# Patient Record
Sex: FEMALE | Race: WHITE | HISPANIC OR LATINO | Employment: FULL TIME | URBAN - METROPOLITAN AREA
[De-identification: names, ages, dates, MRNs, and addresses within clinical notes are randomized per-mention and may not be internally consistent; named-entity substitution may affect disease eponyms.]

---

## 2018-12-26 ENCOUNTER — APPOINTMENT (OUTPATIENT)
Dept: RADIOLOGY | Facility: MEDICAL CENTER | Age: 33
DRG: 503 | End: 2018-12-26
Attending: EMERGENCY MEDICINE
Payer: COMMERCIAL

## 2018-12-26 ENCOUNTER — HOSPITAL ENCOUNTER (INPATIENT)
Facility: MEDICAL CENTER | Age: 33
LOS: 6 days | DRG: 503 | End: 2019-01-02
Attending: EMERGENCY MEDICINE | Admitting: SURGERY
Payer: COMMERCIAL

## 2018-12-26 DIAGNOSIS — I77.71 CAROTID ARTERY DISSECTION (HCC): ICD-10-CM

## 2018-12-26 DIAGNOSIS — S32.009A CLOSED FRACTURE OF TRANSVERSE PROCESS OF LUMBAR VERTEBRA, INITIAL ENCOUNTER (HCC): ICD-10-CM

## 2018-12-26 DIAGNOSIS — S93.324A LISFRANC DISLOCATION, RIGHT, INITIAL ENCOUNTER: ICD-10-CM

## 2018-12-26 DIAGNOSIS — S62.631A CLOSED DISPLACED FRACTURE OF DISTAL PHALANX OF LEFT INDEX FINGER, INITIAL ENCOUNTER: ICD-10-CM

## 2018-12-26 DIAGNOSIS — T14.90XA TRAUMA: ICD-10-CM

## 2018-12-26 PROBLEM — S92.251A: Status: ACTIVE | Noted: 2018-12-26

## 2018-12-26 PROBLEM — S62.639A PHALANX, DISTAL FRACTURE OF FINGER: Status: ACTIVE | Noted: 2018-12-26

## 2018-12-26 PROBLEM — M79.671 RIGHT FOOT PAIN: Status: ACTIVE | Noted: 2018-12-26

## 2018-12-26 LAB
ABO GROUP BLD: NORMAL
ALBUMIN SERPL BCP-MCNC: 4.2 G/DL (ref 3.2–4.9)
ALBUMIN/GLOB SERPL: 1.2 G/DL
ALP SERPL-CCNC: 67 U/L (ref 30–99)
ALT SERPL-CCNC: 8 U/L (ref 2–50)
ANION GAP SERPL CALC-SCNC: 12 MMOL/L (ref 0–11.9)
AST SERPL-CCNC: 26 U/L (ref 12–45)
BILIRUB SERPL-MCNC: 0.2 MG/DL (ref 0.1–1.5)
BLD GP AB SCN SERPL QL: NORMAL
BUN SERPL-MCNC: 12 MG/DL (ref 8–22)
CALCIUM SERPL-MCNC: 9.7 MG/DL (ref 8.5–10.5)
CHLORIDE SERPL-SCNC: 107 MMOL/L (ref 96–112)
CO2 SERPL-SCNC: 17 MMOL/L (ref 20–33)
CREAT SERPL-MCNC: 0.62 MG/DL (ref 0.5–1.4)
ERYTHROCYTE [DISTWIDTH] IN BLOOD BY AUTOMATED COUNT: 43.5 FL (ref 35.9–50)
ETHANOL BLD-MCNC: 0 G/DL
GLOBULIN SER CALC-MCNC: 3.5 G/DL (ref 1.9–3.5)
GLUCOSE SERPL-MCNC: 98 MG/DL (ref 65–99)
HCG SERPL QL: NEGATIVE
HCT VFR BLD AUTO: 34 % (ref 37–47)
HGB BLD-MCNC: 11.5 G/DL (ref 12–16)
MCH RBC QN AUTO: 27.4 PG (ref 27–33)
MCHC RBC AUTO-ENTMCNC: 33.8 G/DL (ref 33.6–35)
MCV RBC AUTO: 81.1 FL (ref 81.4–97.8)
PLATELET # BLD AUTO: 265 K/UL (ref 164–446)
PMV BLD AUTO: 10.8 FL (ref 9–12.9)
POTASSIUM SERPL-SCNC: 4.6 MMOL/L (ref 3.6–5.5)
PROT SERPL-MCNC: 7.7 G/DL (ref 6–8.2)
RBC # BLD AUTO: 4.19 M/UL (ref 4.2–5.4)
RH BLD: NORMAL
SODIUM SERPL-SCNC: 136 MMOL/L (ref 135–145)
WBC # BLD AUTO: 14.3 K/UL (ref 4.8–10.8)

## 2018-12-26 PROCEDURE — 700112 HCHG RX REV CODE 229: Performed by: SURGERY

## 2018-12-26 PROCEDURE — 80307 DRUG TEST PRSMV CHEM ANLYZR: CPT

## 2018-12-26 PROCEDURE — 70498 CT ANGIOGRAPHY NECK: CPT

## 2018-12-26 PROCEDURE — 700102 HCHG RX REV CODE 250 W/ 637 OVERRIDE(OP): Performed by: SURGERY

## 2018-12-26 PROCEDURE — 700111 HCHG RX REV CODE 636 W/ 250 OVERRIDE (IP): Performed by: SURGERY

## 2018-12-26 PROCEDURE — 71045 X-RAY EXAM CHEST 1 VIEW: CPT

## 2018-12-26 PROCEDURE — 700111 HCHG RX REV CODE 636 W/ 250 OVERRIDE (IP)

## 2018-12-26 PROCEDURE — 700117 HCHG RX CONTRAST REV CODE 255: Performed by: EMERGENCY MEDICINE

## 2018-12-26 PROCEDURE — 85027 COMPLETE CBC AUTOMATED: CPT

## 2018-12-26 PROCEDURE — 72128 CT CHEST SPINE W/O DYE: CPT

## 2018-12-26 PROCEDURE — 0QSN04Z REPOSITION RIGHT METATARSAL WITH INTERNAL FIXATION DEVICE, OPEN APPROACH: ICD-10-PCS | Performed by: ORTHOPAEDIC SURGERY

## 2018-12-26 PROCEDURE — 99285 EMERGENCY DEPT VISIT HI MDM: CPT

## 2018-12-26 PROCEDURE — 84703 CHORIONIC GONADOTROPIN ASSAY: CPT

## 2018-12-26 PROCEDURE — 86901 BLOOD TYPING SEROLOGIC RH(D): CPT

## 2018-12-26 PROCEDURE — A9270 NON-COVERED ITEM OR SERVICE: HCPCS | Performed by: SURGERY

## 2018-12-26 PROCEDURE — 305948 HCHG GREEN TRAUMA ACT PRE-NOTIFY NO CC

## 2018-12-26 PROCEDURE — 73620 X-RAY EXAM OF FOOT: CPT | Mod: RT

## 2018-12-26 PROCEDURE — 86850 RBC ANTIBODY SCREEN: CPT

## 2018-12-26 PROCEDURE — 0QSL04Z REPOSITION RIGHT TARSAL WITH INTERNAL FIXATION DEVICE, OPEN APPROACH: ICD-10-PCS | Performed by: ORTHOPAEDIC SURGERY

## 2018-12-26 PROCEDURE — 96376 TX/PRO/DX INJ SAME DRUG ADON: CPT

## 2018-12-26 PROCEDURE — 72131 CT LUMBAR SPINE W/O DYE: CPT

## 2018-12-26 PROCEDURE — 86900 BLOOD TYPING SEROLOGIC ABO: CPT

## 2018-12-26 PROCEDURE — 73130 X-RAY EXAM OF HAND: CPT | Mod: LT

## 2018-12-26 PROCEDURE — 80053 COMPREHEN METABOLIC PANEL: CPT

## 2018-12-26 PROCEDURE — 96375 TX/PRO/DX INJ NEW DRUG ADDON: CPT

## 2018-12-26 PROCEDURE — 94760 N-INVAS EAR/PLS OXIMETRY 1: CPT

## 2018-12-26 PROCEDURE — 70450 CT HEAD/BRAIN W/O DYE: CPT

## 2018-12-26 PROCEDURE — 72125 CT NECK SPINE W/O DYE: CPT

## 2018-12-26 PROCEDURE — 96374 THER/PROPH/DIAG INJ IV PUSH: CPT

## 2018-12-26 PROCEDURE — 74177 CT ABD & PELVIS W/CONTRAST: CPT

## 2018-12-26 PROCEDURE — 72170 X-RAY EXAM OF PELVIS: CPT

## 2018-12-26 RX ORDER — TRAMADOL HYDROCHLORIDE 50 MG/1
50 TABLET ORAL EVERY 4 HOURS PRN
Status: ON HOLD | COMMUNITY
End: 2019-01-02

## 2018-12-26 RX ORDER — CELECOXIB 200 MG/1
200 CAPSULE ORAL 2 TIMES DAILY WITH MEALS
Status: DISPENSED | OUTPATIENT
Start: 2018-12-26 | End: 2018-12-31

## 2018-12-26 RX ORDER — KETOROLAC TROMETHAMINE 30 MG/ML
15 INJECTION, SOLUTION INTRAMUSCULAR; INTRAVENOUS ONCE
Status: COMPLETED | OUTPATIENT
Start: 2018-12-26 | End: 2018-12-26

## 2018-12-26 RX ORDER — OXYCODONE HYDROCHLORIDE 10 MG/1
10 TABLET ORAL
Status: DISCONTINUED | OUTPATIENT
Start: 2018-12-26 | End: 2019-01-02 | Stop reason: HOSPADM

## 2018-12-26 RX ORDER — POLYETHYLENE GLYCOL 3350 17 G/17G
1 POWDER, FOR SOLUTION ORAL 2 TIMES DAILY
Status: DISCONTINUED | OUTPATIENT
Start: 2018-12-26 | End: 2019-01-02 | Stop reason: HOSPADM

## 2018-12-26 RX ORDER — ACETAMINOPHEN 500 MG
1000 TABLET ORAL EVERY 6 HOURS
Status: DISPENSED | OUTPATIENT
Start: 2018-12-27 | End: 2018-12-31

## 2018-12-26 RX ORDER — MORPHINE SULFATE 10 MG/ML
4 INJECTION, SOLUTION INTRAMUSCULAR; INTRAVENOUS
Status: DISCONTINUED | OUTPATIENT
Start: 2018-12-26 | End: 2018-12-28

## 2018-12-26 RX ORDER — MORPHINE SULFATE 10 MG/ML
4 INJECTION, SOLUTION INTRAMUSCULAR; INTRAVENOUS ONCE
Status: COMPLETED | OUTPATIENT
Start: 2018-12-26 | End: 2018-12-26

## 2018-12-26 RX ORDER — DOCUSATE SODIUM 100 MG/1
100 CAPSULE, LIQUID FILLED ORAL 2 TIMES DAILY
Status: DISCONTINUED | OUTPATIENT
Start: 2018-12-26 | End: 2019-01-02 | Stop reason: HOSPADM

## 2018-12-26 RX ORDER — ONDANSETRON 2 MG/ML
4 INJECTION INTRAMUSCULAR; INTRAVENOUS EVERY 4 HOURS PRN
Status: DISCONTINUED | OUTPATIENT
Start: 2018-12-26 | End: 2019-01-02 | Stop reason: HOSPADM

## 2018-12-26 RX ORDER — AMOXICILLIN 250 MG
1 CAPSULE ORAL
Status: DISCONTINUED | OUTPATIENT
Start: 2018-12-26 | End: 2019-01-02 | Stop reason: HOSPADM

## 2018-12-26 RX ORDER — ENEMA 19; 7 G/133ML; G/133ML
1 ENEMA RECTAL
Status: DISCONTINUED | OUTPATIENT
Start: 2018-12-26 | End: 2019-01-02 | Stop reason: HOSPADM

## 2018-12-26 RX ORDER — AMOXICILLIN 250 MG
1 CAPSULE ORAL NIGHTLY
Status: DISCONTINUED | OUTPATIENT
Start: 2018-12-26 | End: 2019-01-02 | Stop reason: HOSPADM

## 2018-12-26 RX ORDER — BISACODYL 10 MG
10 SUPPOSITORY, RECTAL RECTAL
Status: DISCONTINUED | OUTPATIENT
Start: 2018-12-26 | End: 2019-01-02 | Stop reason: HOSPADM

## 2018-12-26 RX ORDER — OXYCODONE HYDROCHLORIDE 5 MG/1
5 TABLET ORAL
Status: DISCONTINUED | OUTPATIENT
Start: 2018-12-26 | End: 2019-01-02 | Stop reason: HOSPADM

## 2018-12-26 RX ORDER — NAPROXEN 250 MG/1
250 TABLET ORAL 2 TIMES DAILY PRN
Status: ON HOLD | COMMUNITY
End: 2019-01-02

## 2018-12-26 RX ADMIN — KETOROLAC TROMETHAMINE 15 MG: 30 INJECTION, SOLUTION INTRAMUSCULAR at 20:45

## 2018-12-26 RX ADMIN — IOHEXOL 100 ML: 350 INJECTION, SOLUTION INTRAVENOUS at 20:20

## 2018-12-26 RX ADMIN — MORPHINE SULFATE 4 MG: 10 INJECTION INTRAVENOUS at 23:41

## 2018-12-26 RX ADMIN — ONDANSETRON 4 MG: 2 INJECTION INTRAMUSCULAR; INTRAVENOUS at 21:32

## 2018-12-26 RX ADMIN — STANDARDIZED SENNA CONCENTRATE AND DOCUSATE SODIUM 1 TABLET: 8.6; 5 TABLET, FILM COATED ORAL at 23:41

## 2018-12-26 RX ADMIN — DOCUSATE SODIUM 100 MG: 100 CAPSULE, LIQUID FILLED ORAL at 23:40

## 2018-12-26 RX ADMIN — MORPHINE SULFATE 4 MG: 10 INJECTION INTRAVENOUS at 21:32

## 2018-12-26 ASSESSMENT — PAIN SCALES - GENERAL: PAINLEVEL_OUTOF10: 10

## 2018-12-27 ENCOUNTER — APPOINTMENT (OUTPATIENT)
Dept: RADIOLOGY | Facility: MEDICAL CENTER | Age: 33
DRG: 503 | End: 2018-12-27
Attending: ORTHOPAEDIC SURGERY
Payer: COMMERCIAL

## 2018-12-27 ENCOUNTER — APPOINTMENT (OUTPATIENT)
Dept: RADIOLOGY | Facility: MEDICAL CENTER | Age: 33
DRG: 503 | End: 2018-12-27
Attending: NURSE PRACTITIONER
Payer: COMMERCIAL

## 2018-12-27 PROBLEM — Z78.9 NO CONTRAINDICATION TO DEEP VEIN THROMBOSIS (DVT) PROPHYLAXIS: Status: ACTIVE | Noted: 2018-12-27

## 2018-12-27 PROBLEM — S92.901A FOOT FRACTURE, RIGHT: Status: ACTIVE | Noted: 2018-12-26

## 2018-12-27 PROBLEM — S93.324A LISFRANC DISLOCATION, RIGHT, INITIAL ENCOUNTER: Status: ACTIVE | Noted: 2018-12-26

## 2018-12-27 LAB
ABO GROUP BLD: NORMAL
ALBUMIN SERPL BCP-MCNC: 4 G/DL (ref 3.2–4.9)
ALBUMIN/GLOB SERPL: 1.3 G/DL
ALP SERPL-CCNC: 70 U/L (ref 30–99)
ALT SERPL-CCNC: 24 U/L (ref 2–50)
ANION GAP SERPL CALC-SCNC: 7 MMOL/L (ref 0–11.9)
APTT PPP: 30.1 SEC (ref 24.7–36)
AST SERPL-CCNC: 55 U/L (ref 12–45)
BASOPHILS # BLD AUTO: 0.3 % (ref 0–1.8)
BASOPHILS # BLD AUTO: 0.4 % (ref 0–1.8)
BASOPHILS # BLD AUTO: 0.4 % (ref 0–1.8)
BASOPHILS # BLD: 0.03 K/UL (ref 0–0.12)
BASOPHILS # BLD: 0.03 K/UL (ref 0–0.12)
BASOPHILS # BLD: 0.04 K/UL (ref 0–0.12)
BILIRUB SERPL-MCNC: 0.4 MG/DL (ref 0.1–1.5)
BUN SERPL-MCNC: 14 MG/DL (ref 8–22)
CALCIUM SERPL-MCNC: 9 MG/DL (ref 8.5–10.5)
CFT BLD TEG: 4.1 MIN (ref 5–10)
CFT BLD TEG: 6 MIN (ref 5–10)
CHLORIDE SERPL-SCNC: 105 MMOL/L (ref 96–112)
CLOT ANGLE BLD TEG: 66.9 DEGREES (ref 53–72)
CLOT ANGLE BLD TEG: 73.3 DEGREES (ref 53–72)
CLOT LYSIS 30M P MA LENFR BLD TEG: 0 % (ref 0–8)
CLOT LYSIS 30M P MA LENFR BLD TEG: 0 % (ref 0–8)
CO2 SERPL-SCNC: 25 MMOL/L (ref 20–33)
CREAT SERPL-MCNC: 0.63 MG/DL (ref 0.5–1.4)
CT.EXTRINSIC BLD ROTEM: 1.2 MIN (ref 1–3)
CT.EXTRINSIC BLD ROTEM: 1.7 MIN (ref 1–3)
EOSINOPHIL # BLD AUTO: 0.02 K/UL (ref 0–0.51)
EOSINOPHIL # BLD AUTO: 0.11 K/UL (ref 0–0.51)
EOSINOPHIL # BLD AUTO: 0.28 K/UL (ref 0–0.51)
EOSINOPHIL NFR BLD: 0.2 % (ref 0–6.9)
EOSINOPHIL NFR BLD: 1.2 % (ref 0–6.9)
EOSINOPHIL NFR BLD: 3.5 % (ref 0–6.9)
ERYTHROCYTE [DISTWIDTH] IN BLOOD BY AUTOMATED COUNT: 45.1 FL (ref 35.9–50)
ERYTHROCYTE [DISTWIDTH] IN BLOOD BY AUTOMATED COUNT: 45.2 FL (ref 35.9–50)
ERYTHROCYTE [DISTWIDTH] IN BLOOD BY AUTOMATED COUNT: 45.3 FL (ref 35.9–50)
GLOBULIN SER CALC-MCNC: 3.2 G/DL (ref 1.9–3.5)
GLUCOSE SERPL-MCNC: 110 MG/DL (ref 65–99)
HCT VFR BLD AUTO: 29.7 % (ref 37–47)
HCT VFR BLD AUTO: 32.4 % (ref 37–47)
HCT VFR BLD AUTO: 33.8 % (ref 37–47)
HGB BLD-MCNC: 10.5 G/DL (ref 12–16)
HGB BLD-MCNC: 10.8 G/DL (ref 12–16)
HGB BLD-MCNC: 9.8 G/DL (ref 12–16)
IMM GRANULOCYTES # BLD AUTO: 0.01 K/UL (ref 0–0.11)
IMM GRANULOCYTES # BLD AUTO: 0.02 K/UL (ref 0–0.11)
IMM GRANULOCYTES # BLD AUTO: 0.03 K/UL (ref 0–0.11)
IMM GRANULOCYTES NFR BLD AUTO: 0.1 % (ref 0–0.9)
IMM GRANULOCYTES NFR BLD AUTO: 0.2 % (ref 0–0.9)
IMM GRANULOCYTES NFR BLD AUTO: 0.3 % (ref 0–0.9)
INR PPP: 1.12 (ref 0.87–1.13)
LYMPHOCYTES # BLD AUTO: 1.71 K/UL (ref 1–4.8)
LYMPHOCYTES # BLD AUTO: 2.02 K/UL (ref 1–4.8)
LYMPHOCYTES # BLD AUTO: 3.74 K/UL (ref 1–4.8)
LYMPHOCYTES NFR BLD: 18.3 % (ref 22–41)
LYMPHOCYTES NFR BLD: 22.6 % (ref 22–41)
LYMPHOCYTES NFR BLD: 46.1 % (ref 22–41)
MAGNESIUM SERPL-MCNC: 1.8 MG/DL (ref 1.5–2.5)
MCF BLD TEG: 70.2 MM (ref 50–70)
MCF BLD TEG: 70.7 MM (ref 50–70)
MCH RBC QN AUTO: 26.3 PG (ref 27–33)
MCH RBC QN AUTO: 26.8 PG (ref 27–33)
MCH RBC QN AUTO: 27.1 PG (ref 27–33)
MCHC RBC AUTO-ENTMCNC: 32 G/DL (ref 33.6–35)
MCHC RBC AUTO-ENTMCNC: 32.4 G/DL (ref 33.6–35)
MCHC RBC AUTO-ENTMCNC: 33 G/DL (ref 33.6–35)
MCV RBC AUTO: 82.3 FL (ref 81.4–97.8)
MCV RBC AUTO: 82.4 FL (ref 81.4–97.8)
MCV RBC AUTO: 82.7 FL (ref 81.4–97.8)
MONOCYTES # BLD AUTO: 0.64 K/UL (ref 0–0.85)
MONOCYTES # BLD AUTO: 0.66 K/UL (ref 0–0.85)
MONOCYTES # BLD AUTO: 0.68 K/UL (ref 0–0.85)
MONOCYTES NFR BLD AUTO: 7.1 % (ref 0–13.4)
MONOCYTES NFR BLD AUTO: 7.6 % (ref 0–13.4)
MONOCYTES NFR BLD AUTO: 7.9 % (ref 0–13.4)
NEUTROPHILS # BLD AUTO: 3.41 K/UL (ref 2–7.15)
NEUTROPHILS # BLD AUTO: 6.07 K/UL (ref 2–7.15)
NEUTROPHILS # BLD AUTO: 6.89 K/UL (ref 2–7.15)
NEUTROPHILS NFR BLD: 42 % (ref 44–72)
NEUTROPHILS NFR BLD: 68 % (ref 44–72)
NEUTROPHILS NFR BLD: 73.8 % (ref 44–72)
NRBC # BLD AUTO: 0 K/UL
NRBC BLD-RTO: 0 /100 WBC
PA AA BLD-ACNC: 30 %
PA AA BLD-ACNC: 87.2 %
PA ADP BLD-ACNC: 0 %
PA ADP BLD-ACNC: 3.9 %
PHOSPHATE SERPL-MCNC: 3.9 MG/DL (ref 2.5–4.5)
PLATELET # BLD AUTO: 269 K/UL (ref 164–446)
PLATELET # BLD AUTO: 311 K/UL (ref 164–446)
PLATELET # BLD AUTO: 315 K/UL (ref 164–446)
PLATELET # BLD AUTO: 320 K/UL (ref 164–446)
PMV BLD AUTO: 10 FL (ref 9–12.9)
PMV BLD AUTO: 10.1 FL (ref 9–12.9)
PMV BLD AUTO: 9.9 FL (ref 9–12.9)
POTASSIUM SERPL-SCNC: 4 MMOL/L (ref 3.6–5.5)
PROT SERPL-MCNC: 7.2 G/DL (ref 6–8.2)
PROTHROMBIN TIME: 14.5 SEC (ref 12–14.6)
RBC # BLD AUTO: 3.61 M/UL (ref 4.2–5.4)
RBC # BLD AUTO: 3.92 M/UL (ref 4.2–5.4)
RBC # BLD AUTO: 4.1 M/UL (ref 4.2–5.4)
RH BLD: NORMAL
SODIUM SERPL-SCNC: 137 MMOL/L (ref 135–145)
TEG ALGORITHM TGALG: ABNORMAL
TEG ALGORITHM TGALG: ABNORMAL
WBC # BLD AUTO: 8.1 K/UL (ref 4.8–10.8)
WBC # BLD AUTO: 8.9 K/UL (ref 4.8–10.8)
WBC # BLD AUTO: 9.3 K/UL (ref 4.8–10.8)

## 2018-12-27 PROCEDURE — 36415 COLL VENOUS BLD VENIPUNCTURE: CPT

## 2018-12-27 PROCEDURE — 700102 HCHG RX REV CODE 250 W/ 637 OVERRIDE(OP): Performed by: SURGERY

## 2018-12-27 PROCEDURE — 700111 HCHG RX REV CODE 636 W/ 250 OVERRIDE (IP): Performed by: SURGERY

## 2018-12-27 PROCEDURE — A9270 NON-COVERED ITEM OR SERVICE: HCPCS | Performed by: NURSE PRACTITIONER

## 2018-12-27 PROCEDURE — 83735 ASSAY OF MAGNESIUM: CPT

## 2018-12-27 PROCEDURE — 80053 COMPREHEN METABOLIC PANEL: CPT

## 2018-12-27 PROCEDURE — 73700 CT LOWER EXTREMITY W/O DYE: CPT | Mod: RT

## 2018-12-27 PROCEDURE — 3E033GC INTRODUCTION OF OTHER THERAPEUTIC SUBSTANCE INTO PERIPHERAL VEIN, PERCUTANEOUS APPROACH: ICD-10-PCS | Performed by: SURGERY

## 2018-12-27 PROCEDURE — 85347 COAGULATION TIME ACTIVATED: CPT | Mod: 91

## 2018-12-27 PROCEDURE — 85610 PROTHROMBIN TIME: CPT

## 2018-12-27 PROCEDURE — 85049 AUTOMATED PLATELET COUNT: CPT

## 2018-12-27 PROCEDURE — 85384 FIBRINOGEN ACTIVITY: CPT | Mod: 91

## 2018-12-27 PROCEDURE — A9270 NON-COVERED ITEM OR SERVICE: HCPCS | Performed by: SURGERY

## 2018-12-27 PROCEDURE — 700112 HCHG RX REV CODE 229: Performed by: SURGERY

## 2018-12-27 PROCEDURE — 85576 BLOOD PLATELET AGGREGATION: CPT | Mod: 91

## 2018-12-27 PROCEDURE — 85025 COMPLETE CBC W/AUTO DIFF WBC: CPT | Mod: 91

## 2018-12-27 PROCEDURE — 700102 HCHG RX REV CODE 250 W/ 637 OVERRIDE(OP): Performed by: NURSE PRACTITIONER

## 2018-12-27 PROCEDURE — 770006 HCHG ROOM/CARE - MED/SURG/GYN SEMI*

## 2018-12-27 PROCEDURE — 73630 X-RAY EXAM OF FOOT: CPT | Mod: RT

## 2018-12-27 PROCEDURE — 84100 ASSAY OF PHOSPHORUS: CPT

## 2018-12-27 PROCEDURE — 73090 X-RAY EXAM OF FOREARM: CPT | Mod: RT

## 2018-12-27 PROCEDURE — 85730 THROMBOPLASTIN TIME PARTIAL: CPT

## 2018-12-27 RX ORDER — CLOPIDOGREL BISULFATE 75 MG/1
75 TABLET ORAL DAILY
Status: DISCONTINUED | OUTPATIENT
Start: 2018-12-27 | End: 2018-12-27

## 2018-12-27 RX ADMIN — DOCUSATE SODIUM 100 MG: 100 CAPSULE, LIQUID FILLED ORAL at 16:51

## 2018-12-27 RX ADMIN — ONDANSETRON 4 MG: 2 INJECTION INTRAMUSCULAR; INTRAVENOUS at 10:59

## 2018-12-27 RX ADMIN — OXYCODONE HYDROCHLORIDE 10 MG: 5 TABLET ORAL at 12:01

## 2018-12-27 RX ADMIN — CELECOXIB 200 MG: 200 CAPSULE ORAL at 10:09

## 2018-12-27 RX ADMIN — OXYCODONE HYDROCHLORIDE 10 MG: 5 TABLET ORAL at 01:26

## 2018-12-27 RX ADMIN — MAGNESIUM HYDROXIDE 30 ML: 400 SUSPENSION ORAL at 07:52

## 2018-12-27 RX ADMIN — ACETAMINOPHEN 1000 MG: 500 TABLET ORAL at 01:29

## 2018-12-27 RX ADMIN — OXYCODONE HYDROCHLORIDE 10 MG: 5 TABLET ORAL at 22:55

## 2018-12-27 RX ADMIN — CLOPIDOGREL 75 MG: 75 TABLET, FILM COATED ORAL at 10:09

## 2018-12-27 RX ADMIN — CELECOXIB 200 MG: 200 CAPSULE ORAL at 16:51

## 2018-12-27 RX ADMIN — HEPARIN SODIUM 1200 UNITS/HR: 5000 INJECTION, SOLUTION INTRAVENOUS; SUBCUTANEOUS at 16:52

## 2018-12-27 RX ADMIN — ACETAMINOPHEN 1000 MG: 500 TABLET ORAL at 12:01

## 2018-12-27 RX ADMIN — ACETAMINOPHEN 1000 MG: 500 TABLET ORAL at 16:51

## 2018-12-27 RX ADMIN — OXYCODONE HYDROCHLORIDE 10 MG: 5 TABLET ORAL at 04:31

## 2018-12-27 RX ADMIN — DOCUSATE SODIUM 100 MG: 100 CAPSULE, LIQUID FILLED ORAL at 07:51

## 2018-12-27 RX ADMIN — POLYETHYLENE GLYCOL 3350 1 PACKET: 17 POWDER, FOR SOLUTION ORAL at 07:52

## 2018-12-27 RX ADMIN — ONDANSETRON 4 MG: 2 INJECTION INTRAMUSCULAR; INTRAVENOUS at 16:04

## 2018-12-27 RX ADMIN — OXYCODONE HYDROCHLORIDE 10 MG: 5 TABLET ORAL at 07:50

## 2018-12-27 RX ADMIN — ACETAMINOPHEN 1000 MG: 500 TABLET ORAL at 07:50

## 2018-12-27 ASSESSMENT — LIFESTYLE VARIABLES
AVERAGE NUMBER OF DAYS PER WEEK YOU HAVE A DRINK CONTAINING ALCOHOL: 1
EVER_SMOKED: NEVER
HOW MANY TIMES IN THE PAST YEAR HAVE YOU HAD 5 OR MORE DRINKS IN A DAY: 0
ALCOHOL_USE: YES
TOTAL SCORE: 1
EVER FELT BAD OR GUILTY ABOUT YOUR DRINKING: NO
TOTAL SCORE: 1
HAVE YOU EVER FELT YOU SHOULD CUT DOWN ON YOUR DRINKING: YES
HAVE PEOPLE ANNOYED YOU BY CRITICIZING YOUR DRINKING: NO
ON A TYPICAL DAY WHEN YOU DRINK ALCOHOL HOW MANY DRINKS DO YOU HAVE: 1
TOTAL SCORE: 1
CONSUMPTION TOTAL: NEGATIVE
EVER HAD A DRINK FIRST THING IN THE MORNING TO STEADY YOUR NERVES TO GET RID OF A HANGOVER: NO
EVER_SMOKED: NEVER

## 2018-12-27 ASSESSMENT — ENCOUNTER SYMPTOMS
VOMITING: 0
SHORTNESS OF BREATH: 0
NAUSEA: 0
COUGH: 0
HEADACHES: 0
FEVER: 0
ROS GI COMMENTS: BM PTA
BACK PAIN: 1
NECK PAIN: 1
DIZZINESS: 0
SENSORY CHANGE: 0
ABDOMINAL PAIN: 0

## 2018-12-27 ASSESSMENT — COGNITIVE AND FUNCTIONAL STATUS - GENERAL
CLIMB 3 TO 5 STEPS WITH RAILING: A LITTLE
DRESSING REGULAR LOWER BODY CLOTHING: A LITTLE
MOBILITY SCORE: 23
DRESSING REGULAR UPPER BODY CLOTHING: A LITTLE
SUGGESTED CMS G CODE MODIFIER MOBILITY: CI
SUGGESTED CMS G CODE MODIFIER DAILY ACTIVITY: CJ
DAILY ACTIVITIY SCORE: 22

## 2018-12-27 ASSESSMENT — PAIN SCALES - GENERAL
PAINLEVEL_OUTOF10: 8
PAINLEVEL_OUTOF10: 8
PAINLEVEL_OUTOF10: 5
PAINLEVEL_OUTOF10: 8

## 2018-12-27 ASSESSMENT — PATIENT HEALTH QUESTIONNAIRE - PHQ9
5. POOR APPETITE OR OVEREATING: NOT AT ALL
2. FEELING DOWN, DEPRESSED, IRRITABLE, OR HOPELESS: SEVERAL DAYS
SUM OF ALL RESPONSES TO PHQ QUESTIONS 1-9: 5
1. LITTLE INTEREST OR PLEASURE IN DOING THINGS: NOT AT ALL
7. TROUBLE CONCENTRATING ON THINGS, SUCH AS READING THE NEWSPAPER OR WATCHING TELEVISION: SEVERAL DAYS
9. THOUGHTS THAT YOU WOULD BE BETTER OFF DEAD, OR OF HURTING YOURSELF: NOT AT ALL
6. FEELING BAD ABOUT YOURSELF - OR THAT YOU ARE A FAILURE OR HAVE LET YOURSELF OR YOUR FAMILY DOWN: NOT AL ALL
4. FEELING TIRED OR HAVING LITTLE ENERGY: SEVERAL DAYS
SUM OF ALL RESPONSES TO PHQ9 QUESTIONS 1 AND 2: 1
8. MOVING OR SPEAKING SO SLOWLY THAT OTHER PEOPLE COULD HAVE NOTICED. OR THE OPPOSITE, BEING SO FIGETY OR RESTLESS THAT YOU HAVE BEEN MOVING AROUND A LOT MORE THAN USUAL: NOT AT ALL
3. TROUBLE FALLING OR STAYING ASLEEP OR SLEEPING TOO MUCH: MORE THAN HALF THE DAYS

## 2018-12-27 ASSESSMENT — COPD QUESTIONNAIRES
DURING THE PAST 4 WEEKS HOW MUCH DID YOU FEEL SHORT OF BREATH: SOME OF THE TIME
DO YOU EVER COUGH UP ANY MUCUS OR PHLEGM?: NO/ONLY WITH OCCASIONAL COLDS OR INFECTIONS
COPD SCREENING SCORE: 0
DURING THE PAST 4 WEEKS HOW MUCH DID YOU FEEL SHORT OF BREATH: NONE/LITTLE OF THE TIME
IN THE PAST 12 MONTHS DO YOU DO LESS THAN YOU USED TO BECAUSE OF YOUR BREATHING PROBLEMS: DISAGREE/UNSURE
HAVE YOU SMOKED AT LEAST 100 CIGARETTES IN YOUR ENTIRE LIFE: NO/DON'T KNOW
COPD SCREENING SCORE: 1
DO YOU EVER COUGH UP ANY MUCUS OR PHLEGM?: NO/ONLY WITH OCCASIONAL COLDS OR INFECTIONS
HAVE YOU SMOKED AT LEAST 100 CIGARETTES IN YOUR ENTIRE LIFE: NO/DON'T KNOW

## 2018-12-27 NOTE — ED NOTES
.Pt resting in room with eyes closed. Chest rise and fall noted. VSS on monitor. In full view of RN. Will continue to monitor.

## 2018-12-27 NOTE — ASSESSMENT & PLAN NOTE
12/27 Weight based heparin initiated  12/31 Heparin held for OR. Resume post-operatively  1/1 Transition to plavix. Heparin gtts stopped  Ambulate TID.  Trauma duplex as clinically indicated.

## 2018-12-27 NOTE — ED NOTES
Pt medicated for pain and given stool softeners per order set. Pt has family at bedside. Pt and family updated on POC. Call light within reach.

## 2018-12-27 NOTE — ASSESSMENT & PLAN NOTE
Grade II dissection of the distal left internal carotid artery just proximal to the carotid canal.  Non-operative management.   Heparin infusion until orthopedic fixation of foot fractures.   12/31 Heparin gtts held for OR. Resume post-operatively.  1/1 Transition to plavix. Heparin gtts stopped  Leonardo Olguin MD. Vascular Surgery.
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
## 2018-12-27 NOTE — PROGRESS NOTES
"Pt arrived with transport from ED. Turned and pivoted from gurney to bed 2.    Reviewed VS, labs, orders, and notes. A&Ox 4.    /74   Pulse 86   Temp 37.1 °C (98.8 °F) (Temporal)   Resp 17   Ht 1.651 m (5' 5\")   Wt 87.1 kg (192 lb)   LMP 12/02/2018   SpO2 98%   Breastfeeding? No   BMI 31.95 kg/m² . MEWS Score: 1.       On room air. Denies SOB.    Moves all extremities, denies numbness or tingling.     Skin assessed over bony prominences and under medical devices.     Voiding, normoactive BS, + flatus, LBM PTA.     Wound(s): generalized bruising and swelling.     Patient reports 8 /10 pain in back and right side, previously medicated per MAR.     Fall prevention education reinforced with pt. Pt is wearing treaded slipper socks. Pt calls appropriatly.     Discussed POC, all questions answered at this time. Bed is locked and in the lowest position, call light within reach, Q 1 hour rounding in place. All needs met at this time.   "

## 2018-12-27 NOTE — PROGRESS NOTES
33yoF with left thumb nondisplaced distal phalanx fracture and questionable right navicular fracture. Admitted to trauma service.  Pending additional views of right foot to rule out fracture.  Can be placed into aluminum fingertip splint for left thumb and recommend nonoperative treatment.  See full dictated consult for further details.

## 2018-12-27 NOTE — ED NOTES
Pt resting in room with eyes closed. Chest rise and fall noted. VSS on monitor. Will continue to monitor.
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
## 2018-12-27 NOTE — CARE PLAN
Problem: Safety  Goal: Will remain free from falls  Outcome: PROGRESSING AS EXPECTED  Pt verbalized understanding of fall prevention education     Problem: Knowledge Deficit  Goal: Knowledge of disease process/condition, treatment plan, diagnostic tests, and medications will improve  Outcome: PROGRESSING AS EXPECTED  Discussed POC and unit policies upon arrival to unit.

## 2018-12-27 NOTE — PROGRESS NOTES
Two RN Skin Assessment:    Patient's skin was assessed under all medical devices and over all areas of bony prominence.    Areas noted: Left shoulder abrasion and bruising.   Right hip abrasion.  Right ankle small red abrasion       No other areas of skin breakdown were noted.

## 2018-12-27 NOTE — ASSESSMENT & PLAN NOTE
Comminuted fracture in the distal aspect of the left first distal phalanx.  Non-operative management. Splinted.   Weight bearing status - Weight bearing as tolerated  Yoandy Neville MD. Orthopedic Surgery.

## 2018-12-27 NOTE — ASSESSMENT & PLAN NOTE
Fractures in the left L1 and L2 transverse processes.   Mobilize as tolerated.  Frequent neurologic assessments with initiation of heparin infusion.

## 2018-12-27 NOTE — ASSESSMENT & PLAN NOTE
CT imaging demonstrated an acute avulsion fracture of the anterolateral aspect of the medial cuneiform. Suspicious for Lisfranc ligamentous injury. Acute avulsion fracture of the posteromedial middle cuneiform. Tiny avulsion fracture of the medial navicular.  12/31 ORIF right foot.  Weight bearing status - Non-weight bearing RLE. CAM walking boot.  Yoandy Neville MD. Orthopedic Surgery.

## 2018-12-27 NOTE — ED NOTES
Report from Lucía. Pt resting on gurney with even respirations. Awaiting pt to go to assigned room.

## 2018-12-28 LAB
APTT PPP: 114.2 SEC (ref 24.7–36)
APTT PPP: 66 SEC (ref 24.7–36)
APTT PPP: 68.1 SEC (ref 24.7–36)
APTT PPP: 83.8 SEC (ref 24.7–36)
APTT PPP: 83.9 SEC (ref 24.7–36)
BASOPHILS # BLD AUTO: 0.4 % (ref 0–1.8)
BASOPHILS # BLD: 0.03 K/UL (ref 0–0.12)
EOSINOPHIL # BLD AUTO: 0.28 K/UL (ref 0–0.51)
EOSINOPHIL NFR BLD: 4 % (ref 0–6.9)
ERYTHROCYTE [DISTWIDTH] IN BLOOD BY AUTOMATED COUNT: 46.3 FL (ref 35.9–50)
HCT VFR BLD AUTO: 30.8 % (ref 37–47)
HGB BLD-MCNC: 9.9 G/DL (ref 12–16)
IMM GRANULOCYTES # BLD AUTO: 0.01 K/UL (ref 0–0.11)
IMM GRANULOCYTES NFR BLD AUTO: 0.1 % (ref 0–0.9)
LYMPHOCYTES # BLD AUTO: 3.56 K/UL (ref 1–4.8)
LYMPHOCYTES NFR BLD: 50.9 % (ref 22–41)
MCH RBC QN AUTO: 26.8 PG (ref 27–33)
MCHC RBC AUTO-ENTMCNC: 32.1 G/DL (ref 33.6–35)
MCV RBC AUTO: 83.2 FL (ref 81.4–97.8)
MONOCYTES # BLD AUTO: 0.62 K/UL (ref 0–0.85)
MONOCYTES NFR BLD AUTO: 8.9 % (ref 0–13.4)
NEUTROPHILS # BLD AUTO: 2.5 K/UL (ref 2–7.15)
NEUTROPHILS NFR BLD: 35.7 % (ref 44–72)
NRBC # BLD AUTO: 0 K/UL
NRBC BLD-RTO: 0 /100 WBC
PLATELET # BLD AUTO: 266 K/UL (ref 164–446)
PMV BLD AUTO: 10 FL (ref 9–12.9)
RBC # BLD AUTO: 3.7 M/UL (ref 4.2–5.4)
WBC # BLD AUTO: 7 K/UL (ref 4.8–10.8)

## 2018-12-28 PROCEDURE — 85730 THROMBOPLASTIN TIME PARTIAL: CPT

## 2018-12-28 PROCEDURE — 770006 HCHG ROOM/CARE - MED/SURG/GYN SEMI*

## 2018-12-28 PROCEDURE — 85025 COMPLETE CBC W/AUTO DIFF WBC: CPT

## 2018-12-28 PROCEDURE — 700102 HCHG RX REV CODE 250 W/ 637 OVERRIDE(OP): Performed by: SURGERY

## 2018-12-28 PROCEDURE — 36415 COLL VENOUS BLD VENIPUNCTURE: CPT

## 2018-12-28 PROCEDURE — 700101 HCHG RX REV CODE 250: Performed by: NURSE PRACTITIONER

## 2018-12-28 PROCEDURE — 700111 HCHG RX REV CODE 636 W/ 250 OVERRIDE (IP): Performed by: SURGERY

## 2018-12-28 PROCEDURE — A9270 NON-COVERED ITEM OR SERVICE: HCPCS | Performed by: SURGERY

## 2018-12-28 PROCEDURE — 700112 HCHG RX REV CODE 229: Performed by: SURGERY

## 2018-12-28 RX ORDER — SODIUM CHLORIDE 9 MG/ML
INJECTION, SOLUTION INTRAVENOUS
Status: ACTIVE
Start: 2018-12-28 | End: 2018-12-29

## 2018-12-28 RX ORDER — LIDOCAINE 50 MG/G
1 PATCH TOPICAL EVERY 24 HOURS
Status: DISCONTINUED | OUTPATIENT
Start: 2018-12-28 | End: 2018-12-29

## 2018-12-28 RX ORDER — MORPHINE SULFATE 4 MG/ML
4 INJECTION, SOLUTION INTRAMUSCULAR; INTRAVENOUS
Status: DISCONTINUED | OUTPATIENT
Start: 2018-12-28 | End: 2019-01-02 | Stop reason: HOSPADM

## 2018-12-28 RX ADMIN — CELECOXIB 200 MG: 200 CAPSULE ORAL at 08:43

## 2018-12-28 RX ADMIN — DOCUSATE SODIUM 100 MG: 100 CAPSULE, LIQUID FILLED ORAL at 05:27

## 2018-12-28 RX ADMIN — ONDANSETRON 4 MG: 2 INJECTION INTRAMUSCULAR; INTRAVENOUS at 02:07

## 2018-12-28 RX ADMIN — LIDOCAINE 1 PATCH: 50 PATCH TOPICAL at 11:27

## 2018-12-28 RX ADMIN — POLYETHYLENE GLYCOL 3350 1 PACKET: 17 POWDER, FOR SOLUTION ORAL at 05:27

## 2018-12-28 RX ADMIN — ACETAMINOPHEN 1000 MG: 500 TABLET ORAL at 05:26

## 2018-12-28 RX ADMIN — POLYETHYLENE GLYCOL 3350 1 PACKET: 17 POWDER, FOR SOLUTION ORAL at 19:44

## 2018-12-28 RX ADMIN — DOCUSATE SODIUM 100 MG: 100 CAPSULE, LIQUID FILLED ORAL at 19:44

## 2018-12-28 RX ADMIN — OXYCODONE HYDROCHLORIDE 10 MG: 5 TABLET ORAL at 23:52

## 2018-12-28 RX ADMIN — OXYCODONE HYDROCHLORIDE 10 MG: 5 TABLET ORAL at 02:04

## 2018-12-28 RX ADMIN — ACETAMINOPHEN 1000 MG: 500 TABLET ORAL at 11:27

## 2018-12-28 RX ADMIN — MAGNESIUM HYDROXIDE 30 ML: 400 SUSPENSION ORAL at 05:27

## 2018-12-28 RX ADMIN — OXYCODONE HYDROCHLORIDE 10 MG: 5 TABLET ORAL at 05:27

## 2018-12-28 RX ADMIN — OXYCODONE HYDROCHLORIDE 10 MG: 5 TABLET ORAL at 20:45

## 2018-12-28 RX ADMIN — ACETAMINOPHEN 1000 MG: 500 TABLET ORAL at 23:52

## 2018-12-28 RX ADMIN — HEPARIN SODIUM 1050 UNITS/HR: 5000 INJECTION, SOLUTION INTRAVENOUS; SUBCUTANEOUS at 14:54

## 2018-12-28 RX ADMIN — ACETAMINOPHEN 1000 MG: 500 TABLET ORAL at 19:44

## 2018-12-28 RX ADMIN — CELECOXIB 200 MG: 200 CAPSULE ORAL at 19:47

## 2018-12-28 RX ADMIN — OXYCODONE HYDROCHLORIDE 10 MG: 5 TABLET ORAL at 14:54

## 2018-12-28 RX ADMIN — STANDARDIZED SENNA CONCENTRATE AND DOCUSATE SODIUM 1 TABLET: 8.6; 5 TABLET, FILM COATED ORAL at 19:44

## 2018-12-28 RX ADMIN — OXYCODONE HYDROCHLORIDE 10 MG: 5 TABLET ORAL at 08:45

## 2018-12-28 ASSESSMENT — ENCOUNTER SYMPTOMS
ROS GI COMMENTS: BM PTA
SENSORY CHANGE: 0
HEADACHES: 0
FEVER: 0
COUGH: 0
NAUSEA: 0
SHORTNESS OF BREATH: 0
DIZZINESS: 0
ABDOMINAL PAIN: 0
VOMITING: 0
BACK PAIN: 1
NECK PAIN: 1

## 2018-12-28 ASSESSMENT — PAIN SCALES - GENERAL
PAINLEVEL_OUTOF10: 8
PAINLEVEL_OUTOF10: 9
PAINLEVEL_OUTOF10: 8

## 2018-12-28 ASSESSMENT — PATIENT HEALTH QUESTIONNAIRE - PHQ9
SUM OF ALL RESPONSES TO PHQ9 QUESTIONS 1 AND 2: 0
1. LITTLE INTEREST OR PLEASURE IN DOING THINGS: NOT AT ALL
2. FEELING DOWN, DEPRESSED, IRRITABLE, OR HOPELESS: NOT AT ALL
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
## 2018-12-28 NOTE — PROGRESS NOTES
Pt reports small amount of bleeding when she wipes following urination.  No juan manuel blood in urine noted.  Pt states she is not near her menstrual cycle.  BE Rosales aware.

## 2018-12-28 NOTE — PROGRESS NOTES
Vascular    Left ICA dissection with high-grade stenosis.  Needs at least plavix and maybe even full anticoagulation.  Discussed holding for foot surgery but will resume as soon as safe therafter    Leonardo Olguin MD  [facility] (General and Vascular Surgery)  Cell: [phone] (text or call is fine, if you don't reach me please try my office)  Office: [phone]  __________________________________________________________________  Patient:Kalina Collier   MRN:[id number]   CSN:[id number]    12/27/2018    4:14 PM

## 2018-12-28 NOTE — PROGRESS NOTES
Assume care.  Neuro exam completed and wnl.  Pt calm and cooperative.  IV running heparin.  Pt up to shower with assist due to wt bearing status.  Breathing reg and unlabored on RA.  No other needs at this time.  Will monitor.

## 2018-12-28 NOTE — PROGRESS NOTES
Pt aox 4. No visible signs of distress. VSS.  Tolerating medication regimen without any signs or symptoms of adverse reactions.  Pt reports 8 /10 pain, medicated per MAR.  Pt reports nausea without emesis. + BS, - BM. Voiding adequately.  Ambulatory standby assist. NWB to RLE.  On room air, no complaints of SOB.  Bed locked and in low position.  Call light within reach and able to make all needs be known.  Will continue to monitor.

## 2018-12-29 LAB
ANION GAP SERPL CALC-SCNC: 7 MMOL/L (ref 0–11.9)
APTT PPP: 70.6 SEC (ref 24.7–36)
BASOPHILS # BLD AUTO: 0.2 % (ref 0–1.8)
BASOPHILS # BLD: 0.02 K/UL (ref 0–0.12)
BUN SERPL-MCNC: 9 MG/DL (ref 8–22)
CALCIUM SERPL-MCNC: 8.8 MG/DL (ref 8.5–10.5)
CHLORIDE SERPL-SCNC: 106 MMOL/L (ref 96–112)
CO2 SERPL-SCNC: 26 MMOL/L (ref 20–33)
CREAT SERPL-MCNC: 0.64 MG/DL (ref 0.5–1.4)
EOSINOPHIL # BLD AUTO: 0.26 K/UL (ref 0–0.51)
EOSINOPHIL NFR BLD: 3.2 % (ref 0–6.9)
ERYTHROCYTE [DISTWIDTH] IN BLOOD BY AUTOMATED COUNT: 46.9 FL (ref 35.9–50)
GLUCOSE SERPL-MCNC: 100 MG/DL (ref 65–99)
HCT VFR BLD AUTO: 30.4 % (ref 37–47)
HGB BLD-MCNC: 9.7 G/DL (ref 12–16)
IMM GRANULOCYTES # BLD AUTO: 0.02 K/UL (ref 0–0.11)
IMM GRANULOCYTES NFR BLD AUTO: 0.2 % (ref 0–0.9)
LYMPHOCYTES # BLD AUTO: 3.61 K/UL (ref 1–4.8)
LYMPHOCYTES NFR BLD: 44.8 % (ref 22–41)
MCH RBC QN AUTO: 27.1 PG (ref 27–33)
MCHC RBC AUTO-ENTMCNC: 31.9 G/DL (ref 33.6–35)
MCV RBC AUTO: 84.9 FL (ref 81.4–97.8)
MONOCYTES # BLD AUTO: 0.68 K/UL (ref 0–0.85)
MONOCYTES NFR BLD AUTO: 8.4 % (ref 0–13.4)
NEUTROPHILS # BLD AUTO: 3.47 K/UL (ref 2–7.15)
NEUTROPHILS NFR BLD: 43.2 % (ref 44–72)
NRBC # BLD AUTO: 0 K/UL
NRBC BLD-RTO: 0 /100 WBC
PLATELET # BLD AUTO: 298 K/UL (ref 164–446)
PMV BLD AUTO: 10.3 FL (ref 9–12.9)
POTASSIUM SERPL-SCNC: 4.2 MMOL/L (ref 3.6–5.5)
RBC # BLD AUTO: 3.58 M/UL (ref 4.2–5.4)
SODIUM SERPL-SCNC: 139 MMOL/L (ref 135–145)
WBC # BLD AUTO: 8.1 K/UL (ref 4.8–10.8)

## 2018-12-29 PROCEDURE — 700112 HCHG RX REV CODE 229: Performed by: SURGERY

## 2018-12-29 PROCEDURE — 80048 BASIC METABOLIC PNL TOTAL CA: CPT

## 2018-12-29 PROCEDURE — A9270 NON-COVERED ITEM OR SERVICE: HCPCS | Performed by: SURGERY

## 2018-12-29 PROCEDURE — 85730 THROMBOPLASTIN TIME PARTIAL: CPT

## 2018-12-29 PROCEDURE — 700111 HCHG RX REV CODE 636 W/ 250 OVERRIDE (IP): Performed by: SURGERY

## 2018-12-29 PROCEDURE — 700101 HCHG RX REV CODE 250: Performed by: NURSE PRACTITIONER

## 2018-12-29 PROCEDURE — 36415 COLL VENOUS BLD VENIPUNCTURE: CPT

## 2018-12-29 PROCEDURE — 85025 COMPLETE CBC W/AUTO DIFF WBC: CPT

## 2018-12-29 PROCEDURE — 770006 HCHG ROOM/CARE - MED/SURG/GYN SEMI*

## 2018-12-29 PROCEDURE — 700102 HCHG RX REV CODE 250 W/ 637 OVERRIDE(OP): Performed by: SURGERY

## 2018-12-29 RX ORDER — LIDOCAINE 50 MG/G
2 PATCH TOPICAL EVERY 24 HOURS
Status: DISCONTINUED | OUTPATIENT
Start: 2018-12-29 | End: 2019-01-02 | Stop reason: HOSPADM

## 2018-12-29 RX ADMIN — HEPARIN SODIUM 1050 UNITS/HR: 5000 INJECTION, SOLUTION INTRAVENOUS; SUBCUTANEOUS at 14:38

## 2018-12-29 RX ADMIN — POLYETHYLENE GLYCOL 3350 1 PACKET: 17 POWDER, FOR SOLUTION ORAL at 20:22

## 2018-12-29 RX ADMIN — MAGNESIUM HYDROXIDE 30 ML: 400 SUSPENSION ORAL at 04:23

## 2018-12-29 RX ADMIN — ONDANSETRON 4 MG: 2 INJECTION INTRAMUSCULAR; INTRAVENOUS at 17:49

## 2018-12-29 RX ADMIN — ONDANSETRON 4 MG: 2 INJECTION INTRAMUSCULAR; INTRAVENOUS at 08:11

## 2018-12-29 RX ADMIN — OXYCODONE HYDROCHLORIDE 10 MG: 5 TABLET ORAL at 23:20

## 2018-12-29 RX ADMIN — CELECOXIB 200 MG: 200 CAPSULE ORAL at 09:07

## 2018-12-29 RX ADMIN — ACETAMINOPHEN 1000 MG: 500 TABLET ORAL at 12:07

## 2018-12-29 RX ADMIN — ACETAMINOPHEN 1000 MG: 500 TABLET ORAL at 20:20

## 2018-12-29 RX ADMIN — LIDOCAINE 2 PATCH: 50 PATCH TOPICAL at 13:37

## 2018-12-29 RX ADMIN — POLYETHYLENE GLYCOL 3350 1 PACKET: 17 POWDER, FOR SOLUTION ORAL at 04:23

## 2018-12-29 RX ADMIN — CELECOXIB 200 MG: 200 CAPSULE ORAL at 20:21

## 2018-12-29 RX ADMIN — DOCUSATE SODIUM 100 MG: 100 CAPSULE, LIQUID FILLED ORAL at 20:21

## 2018-12-29 RX ADMIN — ACETAMINOPHEN 1000 MG: 500 TABLET ORAL at 23:20

## 2018-12-29 RX ADMIN — OXYCODONE HYDROCHLORIDE 10 MG: 5 TABLET ORAL at 09:07

## 2018-12-29 RX ADMIN — STANDARDIZED SENNA CONCENTRATE AND DOCUSATE SODIUM 1 TABLET: 8.6; 5 TABLET, FILM COATED ORAL at 20:21

## 2018-12-29 RX ADMIN — ACETAMINOPHEN 1000 MG: 500 TABLET ORAL at 06:36

## 2018-12-29 RX ADMIN — OXYCODONE HYDROCHLORIDE 10 MG: 5 TABLET ORAL at 12:08

## 2018-12-29 RX ADMIN — OXYCODONE HYDROCHLORIDE 10 MG: 5 TABLET ORAL at 16:21

## 2018-12-29 RX ADMIN — OXYCODONE HYDROCHLORIDE 10 MG: 5 TABLET ORAL at 20:20

## 2018-12-29 RX ADMIN — DOCUSATE SODIUM 100 MG: 100 CAPSULE, LIQUID FILLED ORAL at 04:23

## 2018-12-29 ASSESSMENT — ENCOUNTER SYMPTOMS
VOMITING: 0
FEVER: 0
SHORTNESS OF BREATH: 0
DIZZINESS: 0
COUGH: 0
ABDOMINAL PAIN: 0
BACK PAIN: 1
HEADACHES: 0
NECK PAIN: 1
NAUSEA: 0
ROS GI COMMENTS: BM PTA
SENSORY CHANGE: 0

## 2018-12-29 ASSESSMENT — PAIN SCALES - GENERAL
PAINLEVEL_OUTOF10: 6
PAINLEVEL_OUTOF10: 9
PAINLEVEL_OUTOF10: 8
PAINLEVEL_OUTOF10: 6
PAINLEVEL_OUTOF10: 7
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
## 2018-12-29 NOTE — CARE PLAN
Problem: Knowledge Deficit  Goal: Knowledge of disease process/condition, treatment plan, diagnostic tests, and medications will improve  Outcome: PROGRESSING AS EXPECTED    Intervention: Explain information regarding disease process/condition, treatment plan, diagnostic tests, and medications and document in education  MD and RN updated patient on plan of care, verbalized understanding, questions answered.      Problem: Pain Management  Goal: Pain level will decrease to patient's comfort goal  Outcome: PROGRESSING AS EXPECTED    Intervention: Follow pain managment plan developed in collaboration with patient and Interdisciplinary Team  Patient has Oxy PRN for pain. This has been adequately controlling it. Will continue to monitor and intervene when necessary

## 2018-12-29 NOTE — PROGRESS NOTES
Bedside report received.  Assessment complete.  A&O x 4. Patient calls appropriately.  Patient up with stand by assist and FWW.   Patient has 9/10 pain. PRN oxy given  Some nausea with movement, no vomiting. Tolerating regullar diet.  Surgery scheduled for 12/31, walking boot on R foot. L thumb with splint  + void, - flatus, - BM.  Patient denies SOB.  Patient in pleasant mood, pain management is biggest concern at this time.  Review plan with of care with patient. Call light and personal belongings with in reach. Hourly rounding in place. All needs met at this time.
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
## 2018-12-29 NOTE — CARE PLAN
Problem: Safety  Goal: Will remain free from falls  Outcome: PROGRESSING AS EXPECTED  Patient calls appropriately for assistance.     Problem: Pain Management  Goal: Pain level will decrease to patient's comfort goal  Outcome: PROGRESSING AS EXPECTED  PRN PO pain medication adequately reduces patient's pain
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
## 2018-12-29 NOTE — PROGRESS NOTES
Received report from day shift RN. Assumed patient care  AOx4  Back pain rated at 9/10, repositioned, medicated per MAR. Ice pack applied  Room air  Boot in place to RLE, non weight bearing  Ambulates SBA with FWW with steady gait  Patient reports she has started her period and states that she has started earlier than normal. Educated patient to let nursing staff know if bleeding increases. Patient reports some lightheadedness.  Heparin drip infusing, next aptt scheduled on 12/29 at 2000  LBM PTA, stool softeners administered.   No complaints of nausea at this time, regular diet  Call light within reach  Bed locked and in low position   POC discussed with patient  All needs met at this time.

## 2018-12-30 LAB
ANION GAP SERPL CALC-SCNC: 8 MMOL/L (ref 0–11.9)
APTT PPP: 65.9 SEC (ref 24.7–36)
BASOPHILS # BLD AUTO: 0.3 % (ref 0–1.8)
BASOPHILS # BLD: 0.02 K/UL (ref 0–0.12)
BUN SERPL-MCNC: 10 MG/DL (ref 8–22)
CALCIUM SERPL-MCNC: 8.7 MG/DL (ref 8.5–10.5)
CHLORIDE SERPL-SCNC: 105 MMOL/L (ref 96–112)
CO2 SERPL-SCNC: 24 MMOL/L (ref 20–33)
CREAT SERPL-MCNC: 0.69 MG/DL (ref 0.5–1.4)
EOSINOPHIL # BLD AUTO: 0.17 K/UL (ref 0–0.51)
EOSINOPHIL NFR BLD: 2.5 % (ref 0–6.9)
ERYTHROCYTE [DISTWIDTH] IN BLOOD BY AUTOMATED COUNT: 47.1 FL (ref 35.9–50)
GLUCOSE SERPL-MCNC: 100 MG/DL (ref 65–99)
HCT VFR BLD AUTO: 30.7 % (ref 37–47)
HGB BLD-MCNC: 9.7 G/DL (ref 12–16)
IMM GRANULOCYTES # BLD AUTO: 0.02 K/UL (ref 0–0.11)
IMM GRANULOCYTES NFR BLD AUTO: 0.3 % (ref 0–0.9)
LYMPHOCYTES # BLD AUTO: 3.07 K/UL (ref 1–4.8)
LYMPHOCYTES NFR BLD: 45.9 % (ref 22–41)
MCH RBC QN AUTO: 27 PG (ref 27–33)
MCHC RBC AUTO-ENTMCNC: 31.6 G/DL (ref 33.6–35)
MCV RBC AUTO: 85.5 FL (ref 81.4–97.8)
MONOCYTES # BLD AUTO: 0.54 K/UL (ref 0–0.85)
MONOCYTES NFR BLD AUTO: 8.1 % (ref 0–13.4)
NEUTROPHILS # BLD AUTO: 2.87 K/UL (ref 2–7.15)
NEUTROPHILS NFR BLD: 42.9 % (ref 44–72)
NRBC # BLD AUTO: 0 K/UL
NRBC BLD-RTO: 0 /100 WBC
PLATELET # BLD AUTO: 289 K/UL (ref 164–446)
PMV BLD AUTO: 10.4 FL (ref 9–12.9)
POTASSIUM SERPL-SCNC: 4.1 MMOL/L (ref 3.6–5.5)
RBC # BLD AUTO: 3.59 M/UL (ref 4.2–5.4)
SODIUM SERPL-SCNC: 137 MMOL/L (ref 135–145)
WBC # BLD AUTO: 6.7 K/UL (ref 4.8–10.8)

## 2018-12-30 PROCEDURE — 85025 COMPLETE CBC W/AUTO DIFF WBC: CPT

## 2018-12-30 PROCEDURE — 36415 COLL VENOUS BLD VENIPUNCTURE: CPT

## 2018-12-30 PROCEDURE — A9270 NON-COVERED ITEM OR SERVICE: HCPCS | Performed by: SURGERY

## 2018-12-30 PROCEDURE — 85730 THROMBOPLASTIN TIME PARTIAL: CPT

## 2018-12-30 PROCEDURE — 700112 HCHG RX REV CODE 229: Performed by: SURGERY

## 2018-12-30 PROCEDURE — 700101 HCHG RX REV CODE 250: Performed by: NURSE PRACTITIONER

## 2018-12-30 PROCEDURE — A9270 NON-COVERED ITEM OR SERVICE: HCPCS | Performed by: NURSE PRACTITIONER

## 2018-12-30 PROCEDURE — 700102 HCHG RX REV CODE 250 W/ 637 OVERRIDE(OP): Performed by: SURGERY

## 2018-12-30 PROCEDURE — 700102 HCHG RX REV CODE 250 W/ 637 OVERRIDE(OP): Performed by: NURSE PRACTITIONER

## 2018-12-30 PROCEDURE — 80048 BASIC METABOLIC PNL TOTAL CA: CPT

## 2018-12-30 PROCEDURE — 700111 HCHG RX REV CODE 636 W/ 250 OVERRIDE (IP): Performed by: SURGERY

## 2018-12-30 PROCEDURE — 770006 HCHG ROOM/CARE - MED/SURG/GYN SEMI*

## 2018-12-30 RX ORDER — SCOLOPAMINE TRANSDERMAL SYSTEM 1 MG/1
1 PATCH, EXTENDED RELEASE TRANSDERMAL
Status: DISCONTINUED | OUTPATIENT
Start: 2018-12-30 | End: 2019-01-02 | Stop reason: HOSPADM

## 2018-12-30 RX ADMIN — POLYETHYLENE GLYCOL 3350 1 PACKET: 17 POWDER, FOR SOLUTION ORAL at 17:57

## 2018-12-30 RX ADMIN — ONDANSETRON 4 MG: 2 INJECTION INTRAMUSCULAR; INTRAVENOUS at 05:56

## 2018-12-30 RX ADMIN — CELECOXIB 200 MG: 200 CAPSULE ORAL at 09:17

## 2018-12-30 RX ADMIN — ONDANSETRON 4 MG: 2 INJECTION INTRAMUSCULAR; INTRAVENOUS at 14:12

## 2018-12-30 RX ADMIN — MAGNESIUM CITRATE 148 ML: 1.75 LIQUID ORAL at 09:19

## 2018-12-30 RX ADMIN — POLYETHYLENE GLYCOL 3350 1 PACKET: 17 POWDER, FOR SOLUTION ORAL at 05:32

## 2018-12-30 RX ADMIN — ONDANSETRON 4 MG: 2 INJECTION INTRAMUSCULAR; INTRAVENOUS at 10:16

## 2018-12-30 RX ADMIN — OXYCODONE HYDROCHLORIDE 10 MG: 5 TABLET ORAL at 06:43

## 2018-12-30 RX ADMIN — ACETAMINOPHEN 1000 MG: 500 TABLET ORAL at 17:57

## 2018-12-30 RX ADMIN — HEPARIN SODIUM 1050 UNITS/HR: 5000 INJECTION, SOLUTION INTRAVENOUS; SUBCUTANEOUS at 14:13

## 2018-12-30 RX ADMIN — ACETAMINOPHEN 1000 MG: 500 TABLET ORAL at 05:32

## 2018-12-30 RX ADMIN — DOCUSATE SODIUM 100 MG: 100 CAPSULE, LIQUID FILLED ORAL at 05:32

## 2018-12-30 RX ADMIN — OXYCODONE HYDROCHLORIDE 10 MG: 5 TABLET ORAL at 16:16

## 2018-12-30 RX ADMIN — SCOPALAMINE 1 PATCH: 1 PATCH, EXTENDED RELEASE TRANSDERMAL at 16:17

## 2018-12-30 RX ADMIN — OXYCODONE HYDROCHLORIDE 10 MG: 5 TABLET ORAL at 03:33

## 2018-12-30 RX ADMIN — MAGNESIUM HYDROXIDE 30 ML: 400 SUSPENSION ORAL at 05:32

## 2018-12-30 RX ADMIN — ACETAMINOPHEN 1000 MG: 500 TABLET ORAL at 23:13

## 2018-12-30 RX ADMIN — DOCUSATE SODIUM 100 MG: 100 CAPSULE, LIQUID FILLED ORAL at 17:57

## 2018-12-30 RX ADMIN — OXYCODONE HYDROCHLORIDE 10 MG: 5 TABLET ORAL at 21:05

## 2018-12-30 RX ADMIN — LIDOCAINE 2 PATCH: 50 PATCH TOPICAL at 10:16

## 2018-12-30 RX ADMIN — CELECOXIB 200 MG: 200 CAPSULE ORAL at 17:57

## 2018-12-30 RX ADMIN — OXYCODONE HYDROCHLORIDE 5 MG: 5 TABLET ORAL at 10:16

## 2018-12-30 ASSESSMENT — PAIN SCALES - GENERAL
PAINLEVEL_OUTOF10: ASSUMED PAIN PRESENT
PAINLEVEL_OUTOF10: 6
PAINLEVEL_OUTOF10: 8
PAINLEVEL_OUTOF10: 4
PAINLEVEL_OUTOF10: 4
PAINLEVEL_OUTOF10: ASSUMED PAIN PRESENT
PAINLEVEL_OUTOF10: 8

## 2018-12-30 ASSESSMENT — ENCOUNTER SYMPTOMS
FEVER: 0
SHORTNESS OF BREATH: 0
NAUSEA: 0
BACK PAIN: 1
DIZZINESS: 0
SENSORY CHANGE: 0
HEADACHES: 0
NECK PAIN: 1
ABDOMINAL PAIN: 0
COUGH: 0
ROS GI COMMENTS: BM 12/29
VOMITING: 0

## 2018-12-30 NOTE — CARE PLAN
Problem: Knowledge Deficit  Goal: Knowledge of disease process/condition, treatment plan, diagnostic tests, and medications will improve  Outcome: PROGRESSING AS EXPECTED    Intervention: Explain information regarding disease process/condition, treatment plan, diagnostic tests, and medications and document in education  Patient updated on plan of care by MD and RN. Verbalized understanding      Problem: Pain Management  Goal: Pain level will decrease to patient's comfort goal  Outcome: PROGRESSING AS EXPECTED    Intervention: Follow pain managment plan developed in collaboration with patient and Interdisciplinary Team  PRN oxy helps patient with pain control. Patient also states that the Lidocaine patches help substantially. Scheduled tylenol and gabapentin present. Continuing to monitor and intervene when necessary

## 2018-12-30 NOTE — PROGRESS NOTES
Bedside report received.  Assessment complete.  A&O x 4. Patient calls appropriately.  Patient up with stand by assist and FWW.   Patient has 6/10 pain. Pain medication given  Some nausea, no episodes of emesis. Tolerating regular diet.  R foot boot in place, NWB. L thumb splint in place. Slight swelling on L neck, no changes  + void, + flatus, + BM, however very small, drinking citrate for a bigger BM.  Patient denies SOB.  Patient has surgery scheduled for tomorrow on R foot. Heparin to be stopped at 0600 on 12/31.  Review plan with of care with patient. Call light and personal belongings with in reach. Hourly rounding in place. All needs met at this time.
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
## 2018-12-30 NOTE — CARE PLAN
Problem: Communication  Goal: The ability to communicate needs accurately and effectively will improve  Outcome: PROGRESSING AS EXPECTED  Patient able to articulate needs. Utilizes call light appropriately and calls for assistance with ambulation to/from bathroom.     Problem: Safety  Goal: Will remain free from injury  Outcome: PROGRESSING AS EXPECTED  SBA with FWW. Reinforced fall education and staff assistance with toileting. Verbalized understanding.     Problem: Bowel/Gastric:  Goal: Normal bowel function is maintained or improved  Outcome: PROGRESSING AS EXPECTED  Normoactive BS x 4 quadrants. Fluids encouraged. + BM reported.

## 2018-12-30 NOTE — PROGRESS NOTES
"Received report from day shift RN; assumed care. AOx4. VSS. 95% on RA. Medicated for pain; see MAR.   Ice pack to low back. Boot in place to RLE, non weight bearing. + CMS. Ambulates SBA with FWW with steady gait. + Menses. + voiding. Normoactive BS x 4 quadrants. Mild tenderness noted/reported with palpation. + BM. Heparin drip infusing, rate verified by 2 RNS. Next aPTT scheduled in 24 hours. Patient tolerating diet, the nausea reported during day shift had subsided. Patient stated she was \"picking at her food.\"   No complaints of nausea at this time. POC discussed. Bed locked/lowest position. Call light/belongings within reach. All needs met at present time.   "

## 2018-12-30 NOTE — PROGRESS NOTES
Patient unable to void since catheter removal, bladder scan results 270mL. Will give more time and rescan if cannot void
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
## 2018-12-31 ENCOUNTER — APPOINTMENT (OUTPATIENT)
Dept: RADIOLOGY | Facility: MEDICAL CENTER | Age: 33
DRG: 503 | End: 2018-12-31
Attending: ORTHOPAEDIC SURGERY
Payer: COMMERCIAL

## 2018-12-31 LAB
ANION GAP SERPL CALC-SCNC: 6 MMOL/L (ref 0–11.9)
BASOPHILS # BLD AUTO: 0.4 % (ref 0–1.8)
BASOPHILS # BLD: 0.03 K/UL (ref 0–0.12)
BUN SERPL-MCNC: 10 MG/DL (ref 8–22)
CALCIUM SERPL-MCNC: 8.8 MG/DL (ref 8.5–10.5)
CHLORIDE SERPL-SCNC: 106 MMOL/L (ref 96–112)
CO2 SERPL-SCNC: 25 MMOL/L (ref 20–33)
CREAT SERPL-MCNC: 0.61 MG/DL (ref 0.5–1.4)
EOSINOPHIL # BLD AUTO: 0.16 K/UL (ref 0–0.51)
EOSINOPHIL NFR BLD: 2.3 % (ref 0–6.9)
ERYTHROCYTE [DISTWIDTH] IN BLOOD BY AUTOMATED COUNT: 48.2 FL (ref 35.9–50)
GLUCOSE SERPL-MCNC: 96 MG/DL (ref 65–99)
HCG UR QL: NEGATIVE
HCT VFR BLD AUTO: 30.6 % (ref 37–47)
HGB BLD-MCNC: 9.5 G/DL (ref 12–16)
IMM GRANULOCYTES # BLD AUTO: 0.03 K/UL (ref 0–0.11)
IMM GRANULOCYTES NFR BLD AUTO: 0.4 % (ref 0–0.9)
LYMPHOCYTES # BLD AUTO: 3.1 K/UL (ref 1–4.8)
LYMPHOCYTES NFR BLD: 43.7 % (ref 22–41)
MCH RBC QN AUTO: 26.8 PG (ref 27–33)
MCHC RBC AUTO-ENTMCNC: 31 G/DL (ref 33.6–35)
MCV RBC AUTO: 86.2 FL (ref 81.4–97.8)
MONOCYTES # BLD AUTO: 0.6 K/UL (ref 0–0.85)
MONOCYTES NFR BLD AUTO: 8.5 % (ref 0–13.4)
NEUTROPHILS # BLD AUTO: 3.18 K/UL (ref 2–7.15)
NEUTROPHILS NFR BLD: 44.7 % (ref 44–72)
NRBC # BLD AUTO: 0 K/UL
NRBC BLD-RTO: 0 /100 WBC
PLATELET # BLD AUTO: 285 K/UL (ref 164–446)
PMV BLD AUTO: 10 FL (ref 9–12.9)
POTASSIUM SERPL-SCNC: 4.2 MMOL/L (ref 3.6–5.5)
RBC # BLD AUTO: 3.55 M/UL (ref 4.2–5.4)
SODIUM SERPL-SCNC: 137 MMOL/L (ref 135–145)
SP GR UR STRIP.AUTO: 1.01
WBC # BLD AUTO: 7.1 K/UL (ref 4.8–10.8)

## 2018-12-31 PROCEDURE — 80048 BASIC METABOLIC PNL TOTAL CA: CPT

## 2018-12-31 PROCEDURE — 160009 HCHG ANES TIME/MIN: Performed by: ORTHOPAEDIC SURGERY

## 2018-12-31 PROCEDURE — A9270 NON-COVERED ITEM OR SERVICE: HCPCS | Performed by: SURGERY

## 2018-12-31 PROCEDURE — 500881 HCHG PACK, EXTREMITY: Performed by: ORTHOPAEDIC SURGERY

## 2018-12-31 PROCEDURE — 81025 URINE PREGNANCY TEST: CPT

## 2018-12-31 PROCEDURE — 160048 HCHG OR STATISTICAL LEVEL 1-5: Performed by: ORTHOPAEDIC SURGERY

## 2018-12-31 PROCEDURE — 160028 HCHG SURGERY MINUTES - 1ST 30 MINS LEVEL 3: Performed by: ORTHOPAEDIC SURGERY

## 2018-12-31 PROCEDURE — 160002 HCHG RECOVERY MINUTES (STAT): Performed by: ORTHOPAEDIC SURGERY

## 2018-12-31 PROCEDURE — 160036 HCHG PACU - EA ADDL 30 MINS PHASE I: Performed by: ORTHOPAEDIC SURGERY

## 2018-12-31 PROCEDURE — 81002 URINALYSIS NONAUTO W/O SCOPE: CPT

## 2018-12-31 PROCEDURE — 160039 HCHG SURGERY MINUTES - EA ADDL 1 MIN LEVEL 3: Performed by: ORTHOPAEDIC SURGERY

## 2018-12-31 PROCEDURE — 700111 HCHG RX REV CODE 636 W/ 250 OVERRIDE (IP): Performed by: ORTHOPAEDIC SURGERY

## 2018-12-31 PROCEDURE — C1713 ANCHOR/SCREW BN/BN,TIS/BN: HCPCS | Performed by: ORTHOPAEDIC SURGERY

## 2018-12-31 PROCEDURE — 700105 HCHG RX REV CODE 258

## 2018-12-31 PROCEDURE — 85025 COMPLETE CBC W/AUTO DIFF WBC: CPT

## 2018-12-31 PROCEDURE — A9270 NON-COVERED ITEM OR SERVICE: HCPCS | Performed by: ANESTHESIOLOGY

## 2018-12-31 PROCEDURE — 700102 HCHG RX REV CODE 250 W/ 637 OVERRIDE(OP): Performed by: SURGERY

## 2018-12-31 PROCEDURE — 36415 COLL VENOUS BLD VENIPUNCTURE: CPT

## 2018-12-31 PROCEDURE — 501838 HCHG SUTURE GENERAL: Performed by: ORTHOPAEDIC SURGERY

## 2018-12-31 PROCEDURE — 700111 HCHG RX REV CODE 636 W/ 250 OVERRIDE (IP): Performed by: SURGERY

## 2018-12-31 PROCEDURE — 160035 HCHG PACU - 1ST 60 MINS PHASE I: Performed by: ORTHOPAEDIC SURGERY

## 2018-12-31 PROCEDURE — 770006 HCHG ROOM/CARE - MED/SURG/GYN SEMI*

## 2018-12-31 PROCEDURE — 700102 HCHG RX REV CODE 250 W/ 637 OVERRIDE(OP): Performed by: ANESTHESIOLOGY

## 2018-12-31 PROCEDURE — 700111 HCHG RX REV CODE 636 W/ 250 OVERRIDE (IP): Performed by: ANESTHESIOLOGY

## 2018-12-31 PROCEDURE — 700101 HCHG RX REV CODE 250

## 2018-12-31 PROCEDURE — 700111 HCHG RX REV CODE 636 W/ 250 OVERRIDE (IP)

## 2018-12-31 PROCEDURE — 502000 HCHG MISC OR IMPLANTS RC 0278: Performed by: ORTHOPAEDIC SURGERY

## 2018-12-31 PROCEDURE — 73630 X-RAY EXAM OF FOOT: CPT | Mod: RT

## 2018-12-31 DEVICE — SCREW LOCKING SELF TAPPING WITH T8 STARDRIVE VA RECESS 2.7MM 12MM (1TX6+1TX3=9): Type: IMPLANTABLE DEVICE | Site: FOOT | Status: FUNCTIONAL

## 2018-12-31 DEVICE — SCREW CORT 3.5X30MM ST HEX (4TX6+1TX4+1TX3=31)(SDS=22): Type: IMPLANTABLE DEVICE | Site: FOOT | Status: FUNCTIONAL

## 2018-12-31 DEVICE — SCREW LOCKING SELF TAPPING WITH T8 STARDRIVE VA RECESS 2.7MM 20MM (1TX6+1TX3=9): Type: IMPLANTABLE DEVICE | Site: FOOT | Status: FUNCTIONAL

## 2018-12-31 DEVICE — IMPLANTABLE DEVICE: Type: IMPLANTABLE DEVICE | Site: FOOT | Status: FUNCTIONAL

## 2018-12-31 DEVICE — SCREW CORT 3.5X34MM ST HEX (4TX6+1TX4+1TX3=31)(SDS=22): Type: IMPLANTABLE DEVICE | Site: FOOT | Status: FUNCTIONAL

## 2018-12-31 DEVICE — SCREW LOCKING SELF TAPPING WITH T8 STARDRIVE VA RECESS 2.7MM 18MM (1TX6+1TX3=9): Type: IMPLANTABLE DEVICE | Site: FOOT | Status: FUNCTIONAL

## 2018-12-31 RX ORDER — HYDRALAZINE HYDROCHLORIDE 20 MG/ML
5 INJECTION INTRAMUSCULAR; INTRAVENOUS
Status: DISCONTINUED | OUTPATIENT
Start: 2018-12-31 | End: 2018-12-31 | Stop reason: HOSPADM

## 2018-12-31 RX ORDER — HYDROMORPHONE HYDROCHLORIDE 1 MG/ML
0.4 INJECTION, SOLUTION INTRAMUSCULAR; INTRAVENOUS; SUBCUTANEOUS
Status: DISCONTINUED | OUTPATIENT
Start: 2018-12-31 | End: 2018-12-31 | Stop reason: HOSPADM

## 2018-12-31 RX ORDER — DIPHENHYDRAMINE HYDROCHLORIDE 50 MG/ML
12.5 INJECTION INTRAMUSCULAR; INTRAVENOUS
Status: DISCONTINUED | OUTPATIENT
Start: 2018-12-31 | End: 2018-12-31 | Stop reason: HOSPADM

## 2018-12-31 RX ORDER — BUPIVACAINE HYDROCHLORIDE AND EPINEPHRINE 5; 5 MG/ML; UG/ML
INJECTION, SOLUTION EPIDURAL; INTRACAUDAL; PERINEURAL
Status: DISCONTINUED | OUTPATIENT
Start: 2018-12-31 | End: 2018-12-31 | Stop reason: HOSPADM

## 2018-12-31 RX ORDER — CEFAZOLIN SODIUM 2 G/100ML
2 INJECTION, SOLUTION INTRAVENOUS EVERY 8 HOURS
Status: COMPLETED | OUTPATIENT
Start: 2018-12-31 | End: 2019-01-01

## 2018-12-31 RX ORDER — SODIUM CHLORIDE 9 MG/ML
INJECTION, SOLUTION INTRAVENOUS
Status: COMPLETED
Start: 2018-12-31 | End: 2018-12-31

## 2018-12-31 RX ORDER — ONDANSETRON 2 MG/ML
4 INJECTION INTRAMUSCULAR; INTRAVENOUS
Status: DISCONTINUED | OUTPATIENT
Start: 2018-12-31 | End: 2018-12-31 | Stop reason: HOSPADM

## 2018-12-31 RX ORDER — ACETAMINOPHEN 500 MG
1000 TABLET ORAL ONCE
Status: COMPLETED | OUTPATIENT
Start: 2018-12-31 | End: 2018-12-31

## 2018-12-31 RX ORDER — MEPERIDINE HYDROCHLORIDE 25 MG/ML
12.5 INJECTION INTRAMUSCULAR; INTRAVENOUS; SUBCUTANEOUS
Status: DISCONTINUED | OUTPATIENT
Start: 2018-12-31 | End: 2018-12-31 | Stop reason: HOSPADM

## 2018-12-31 RX ORDER — SODIUM CHLORIDE, SODIUM LACTATE, POTASSIUM CHLORIDE, CALCIUM CHLORIDE 600; 310; 30; 20 MG/100ML; MG/100ML; MG/100ML; MG/100ML
INJECTION, SOLUTION INTRAVENOUS CONTINUOUS
Status: DISCONTINUED | OUTPATIENT
Start: 2018-12-31 | End: 2018-12-31 | Stop reason: HOSPADM

## 2018-12-31 RX ORDER — HALOPERIDOL 5 MG/ML
1 INJECTION INTRAMUSCULAR
Status: DISCONTINUED | OUTPATIENT
Start: 2018-12-31 | End: 2018-12-31 | Stop reason: HOSPADM

## 2018-12-31 RX ORDER — GABAPENTIN 300 MG/1
300 CAPSULE ORAL ONCE
Status: COMPLETED | OUTPATIENT
Start: 2018-12-31 | End: 2018-12-31

## 2018-12-31 RX ORDER — HYDROMORPHONE HYDROCHLORIDE 1 MG/ML
0.2 INJECTION, SOLUTION INTRAMUSCULAR; INTRAVENOUS; SUBCUTANEOUS
Status: DISCONTINUED | OUTPATIENT
Start: 2018-12-31 | End: 2018-12-31 | Stop reason: HOSPADM

## 2018-12-31 RX ORDER — CELECOXIB 200 MG/1
200 CAPSULE ORAL ONCE
Status: COMPLETED | OUTPATIENT
Start: 2018-12-31 | End: 2018-12-31

## 2018-12-31 RX ORDER — OXYCODONE HCL 5 MG/5 ML
10 SOLUTION, ORAL ORAL
Status: COMPLETED | OUTPATIENT
Start: 2018-12-31 | End: 2018-12-31

## 2018-12-31 RX ORDER — OXYCODONE HCL 5 MG/5 ML
5 SOLUTION, ORAL ORAL
Status: COMPLETED | OUTPATIENT
Start: 2018-12-31 | End: 2018-12-31

## 2018-12-31 RX ORDER — HYDROMORPHONE HYDROCHLORIDE 1 MG/ML
0.1 INJECTION, SOLUTION INTRAMUSCULAR; INTRAVENOUS; SUBCUTANEOUS
Status: DISCONTINUED | OUTPATIENT
Start: 2018-12-31 | End: 2018-12-31 | Stop reason: HOSPADM

## 2018-12-31 RX ADMIN — CELECOXIB 200 MG: 200 CAPSULE ORAL at 14:25

## 2018-12-31 RX ADMIN — ACETAMINOPHEN 1000 MG: 500 TABLET ORAL at 05:02

## 2018-12-31 RX ADMIN — FENTANYL CITRATE 50 MCG: 50 INJECTION, SOLUTION INTRAMUSCULAR; INTRAVENOUS at 17:32

## 2018-12-31 RX ADMIN — OXYCODONE HYDROCHLORIDE 10 MG: 5 TABLET ORAL at 21:53

## 2018-12-31 RX ADMIN — OXYCODONE HYDROCHLORIDE 10 MG: 5 TABLET ORAL at 08:42

## 2018-12-31 RX ADMIN — ACETAMINOPHEN 1000 MG: 500 TABLET ORAL at 11:30

## 2018-12-31 RX ADMIN — STANDARDIZED SENNA CONCENTRATE AND DOCUSATE SODIUM 1 TABLET: 8.6; 5 TABLET, FILM COATED ORAL at 21:53

## 2018-12-31 RX ADMIN — CEFAZOLIN SODIUM 2 G: 2 INJECTION, SOLUTION INTRAVENOUS at 21:53

## 2018-12-31 RX ADMIN — HEPARIN SODIUM 1050 UNITS/HR: 5000 INJECTION, SOLUTION INTRAVENOUS; SUBCUTANEOUS at 23:44

## 2018-12-31 RX ADMIN — FENTANYL CITRATE 50 MCG: 50 INJECTION, SOLUTION INTRAMUSCULAR; INTRAVENOUS at 17:50

## 2018-12-31 RX ADMIN — OXYCODONE HYDROCHLORIDE 10 MG: 5 SOLUTION ORAL at 17:31

## 2018-12-31 RX ADMIN — GABAPENTIN 300 MG: 300 CAPSULE ORAL at 14:25

## 2018-12-31 RX ADMIN — MORPHINE SULFATE 4 MG: 4 INJECTION INTRAVENOUS at 18:40

## 2018-12-31 RX ADMIN — SODIUM CHLORIDE 500 ML: 9 INJECTION, SOLUTION INTRAVENOUS at 21:54

## 2018-12-31 ASSESSMENT — PAIN SCALES - GENERAL
PAINLEVEL_OUTOF10: 5
PAINLEVEL_OUTOF10: ASSUMED PAIN PRESENT
PAINLEVEL_OUTOF10: 8
PAINLEVEL_OUTOF10: 8
PAINLEVEL_OUTOF10: 7
PAINLEVEL_OUTOF10: 8
PAINLEVEL_OUTOF10: 7
PAINLEVEL_OUTOF10: 8

## 2018-12-31 ASSESSMENT — ENCOUNTER SYMPTOMS
ROS GI COMMENTS: BM 12/31
VOMITING: 0
NECK PAIN: 1
NAUSEA: 0
FEVER: 0
DIZZINESS: 0
ABDOMINAL PAIN: 0
SHORTNESS OF BREATH: 0
HEADACHES: 0
BACK PAIN: 1
COUGH: 0
SENSORY CHANGE: 0

## 2018-12-31 NOTE — CARE PLAN
Problem: Infection  Goal: Will remain free from infection  Outcome: PROGRESSING AS EXPECTED  Witnessed hand hygiene performed. Patient reported taking a shower pre-operatively. Educated patient about CHG wipes pre-operatively to reduce risk of infection. Verbalized understanding.     Problem: Knowledge Deficit  Goal: Knowledge of the prescribed therapeutic regimen will improve  Outcome: PROGRESSING AS EXPECTED  Patient educated upon medication administration/heparin and preoperative expectations. Patient verbalized understanding.
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
## 2018-12-31 NOTE — PROGRESS NOTES
Report received from RN, assumed care at 0700  Pt is A0X4, and responds appropriately   Pt declines any SOB, chest pain, new onset of numbness/ tingiling  Pt rates pain at 7/10, on a scale of 1-10, pt medicated per MAR  Pt is voiding adequatly and without hesitancy  Pt has + flatus, + bowel sounds, + BM on 12/21/2018  Pt ambulates with a x1 assist and a FWW  Pt is NPO awaiting surgery, pt denies any nausea/vomiting  Pt is NWB on RLE  Pt has L thumb splint in place     Plan of care discussed, all questions answered. Explained importance of calling before getting OOB and pt verbalizes understanding. Explained importance of oral care. Call light is within reach, treaded slipper socks on, bed in lowest/ locked position, hourly rounding in place, all needs met at this time

## 2018-12-31 NOTE — CARE PLAN
Problem: Communication  Goal: The ability to communicate needs accurately and effectively will improve  Outcome: PROGRESSING AS EXPECTED  Pt updated on POC, all questions answered at this time     Problem: Safety  Goal: Will remain free from injury  Outcome: PROGRESSING AS EXPECTED  Call light is within reach, treaded socks on, bed in lowest position, personal belongings are within reach, all needs met at this time

## 2018-12-31 NOTE — PROGRESS NOTES
Received report from AM RN; assumed care. A&O x 4. VSS. 97% on RA. Medicated for pain; see MAR. Patient denies SOB, nausea, vomiting, numbness, tingling. Reduction of nausea reported d/t three BMs during day time per pt report. Patient up SBA with FWW, with CAM walking boot. Patient tolerating diet. Educated about NPO status at midnight for surgery. Patient verbalized understanding. Left thumb splint in place, but removed by patient intermittently. Cold wash cloth applied to left neck, swelling unchanged. + void, + flatus, + BM. Educated patient about stopping Heparin at 0600. Verbalized understanding of POC. Bed locked/lowest position. Call light/personal belongings within reach. All needs met at this time.
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
## 2019-01-01 LAB — APTT PPP: 57.9 SEC (ref 24.7–36)

## 2019-01-01 PROCEDURE — 700102 HCHG RX REV CODE 250 W/ 637 OVERRIDE(OP): Performed by: NURSE PRACTITIONER

## 2019-01-01 PROCEDURE — 700111 HCHG RX REV CODE 636 W/ 250 OVERRIDE (IP): Performed by: ORTHOPAEDIC SURGERY

## 2019-01-01 PROCEDURE — 85730 THROMBOPLASTIN TIME PARTIAL: CPT

## 2019-01-01 PROCEDURE — 700101 HCHG RX REV CODE 250: Performed by: NURSE PRACTITIONER

## 2019-01-01 PROCEDURE — 770006 HCHG ROOM/CARE - MED/SURG/GYN SEMI*

## 2019-01-01 PROCEDURE — A9270 NON-COVERED ITEM OR SERVICE: HCPCS | Performed by: SURGERY

## 2019-01-01 PROCEDURE — 97161 PT EVAL LOW COMPLEX 20 MIN: CPT

## 2019-01-01 PROCEDURE — 36415 COLL VENOUS BLD VENIPUNCTURE: CPT

## 2019-01-01 PROCEDURE — A9270 NON-COVERED ITEM OR SERVICE: HCPCS | Performed by: NURSE PRACTITIONER

## 2019-01-01 PROCEDURE — 700102 HCHG RX REV CODE 250 W/ 637 OVERRIDE(OP): Performed by: SURGERY

## 2019-01-01 RX ORDER — CLOPIDOGREL BISULFATE 75 MG/1
75 TABLET ORAL DAILY
Status: DISCONTINUED | OUTPATIENT
Start: 2019-01-01 | End: 2019-01-02 | Stop reason: HOSPADM

## 2019-01-01 RX ADMIN — CEFAZOLIN SODIUM 2 G: 2 INJECTION, SOLUTION INTRAVENOUS at 05:07

## 2019-01-01 RX ADMIN — OXYCODONE HYDROCHLORIDE 10 MG: 5 TABLET ORAL at 08:55

## 2019-01-01 RX ADMIN — OXYCODONE HYDROCHLORIDE 10 MG: 5 TABLET ORAL at 02:51

## 2019-01-01 RX ADMIN — LIDOCAINE 2 PATCH: 50 PATCH TOPICAL at 13:06

## 2019-01-01 RX ADMIN — OXYCODONE HYDROCHLORIDE 10 MG: 5 TABLET ORAL at 19:53

## 2019-01-01 RX ADMIN — OXYCODONE HYDROCHLORIDE 10 MG: 5 TABLET ORAL at 12:01

## 2019-01-01 RX ADMIN — OXYCODONE HYDROCHLORIDE 10 MG: 5 TABLET ORAL at 16:46

## 2019-01-01 RX ADMIN — CLOPIDOGREL 75 MG: 75 TABLET, FILM COATED ORAL at 12:00

## 2019-01-01 ASSESSMENT — ENCOUNTER SYMPTOMS
SHORTNESS OF BREATH: 0
FEVER: 0
COUGH: 0
ROS GI COMMENTS: BM 1/1
HEADACHES: 0
ABDOMINAL PAIN: 0
DIZZINESS: 0
NECK PAIN: 1
NAUSEA: 0
VOMITING: 0
SENSORY CHANGE: 0
BACK PAIN: 1

## 2019-01-01 ASSESSMENT — GAIT ASSESSMENTS
GAIT LEVEL OF ASSIST: SUPERVISED
DISTANCE (FEET): 45
ASSISTIVE DEVICE: FRONT WHEEL WALKER

## 2019-01-01 ASSESSMENT — PAIN SCALES - GENERAL
PAINLEVEL_OUTOF10: 8
PAINLEVEL_OUTOF10: 7
PAINLEVEL_OUTOF10: 6
PAINLEVEL_OUTOF10: 9
PAINLEVEL_OUTOF10: 9
PAINLEVEL_OUTOF10: 5
PAINLEVEL_OUTOF10: 6
PAINLEVEL_OUTOF10: 7

## 2019-01-01 ASSESSMENT — COGNITIVE AND FUNCTIONAL STATUS - GENERAL
MOBILITY SCORE: 21
SUGGESTED CMS G CODE MODIFIER MOBILITY: CJ
STANDING UP FROM CHAIR USING ARMS: A LITTLE
WALKING IN HOSPITAL ROOM: A LITTLE
CLIMB 3 TO 5 STEPS WITH RAILING: A LITTLE

## 2019-01-01 NOTE — CARE PLAN
Problem: Bowel/Gastric:  Goal: Normal bowel function is maintained or improved  Outcome: PROGRESSING AS EXPECTED  Hold bowel meds this evening as pt has had multiple BMs today

## 2019-01-01 NOTE — PROGRESS NOTES
Bedside report received.  Assessment complete.  A&O x 4. Patient calls appropriately.  Patient up with stand by assist with walker..   Patient has 7/10 pain. Given PRN oxy 10 for R foot pain.  Denies N&V. Tolerating regular diet.  Walking boot to R leg/foot on, pt ambulates well with walker with this on..  + void, + flatus, + BM this AM.  Patient denies SOB.  SCD's on while in bed.  Patient pleasant with staff, is to work with PT this AM. Hep gtt still running at 1050units/ hr and is therapeutic. Next PTT draw at 0030 tonight. Intact nuero-wise, no deficits.  Review plan with of care with patient. Call light and personal belongings with in reach. Hourly rounding in place. All needs met at this time.
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
## 2019-01-01 NOTE — PROGRESS NOTES
Received bedside report and assumed care at 1900    Pt is A&O x4  Pain 8/10, medicated per MAR  Denies nausea  Tolerating regular diet  + Urine Output  + Flatus  + BM  Ortho boot to right foot  Up with x1 assistance and FWW   Bed in lowest position and locked.  Reviewed plan of care with patient, pt resting comfortably now, call light within reach, all needs met at this time

## 2019-01-01 NOTE — CARE PLAN
Problem: Pain Management  Goal: Pain level will decrease to patient's comfort goal  Outcome: PROGRESSING AS EXPECTED  Give PRN analgesics when requested if available, utilize heat packs and lidocaine patch

## 2019-01-02 VITALS
RESPIRATION RATE: 16 BRPM | HEIGHT: 65 IN | WEIGHT: 199.74 LBS | OXYGEN SATURATION: 99 % | HEART RATE: 87 BPM | TEMPERATURE: 98.6 F | DIASTOLIC BLOOD PRESSURE: 89 MMHG | SYSTOLIC BLOOD PRESSURE: 129 MMHG | BODY MASS INDEX: 33.28 KG/M2

## 2019-01-02 LAB — APTT PPP: 34.5 SEC (ref 24.7–36)

## 2019-01-02 PROCEDURE — 700102 HCHG RX REV CODE 250 W/ 637 OVERRIDE(OP): Performed by: NURSE PRACTITIONER

## 2019-01-02 PROCEDURE — A9270 NON-COVERED ITEM OR SERVICE: HCPCS | Performed by: SURGERY

## 2019-01-02 PROCEDURE — A9270 NON-COVERED ITEM OR SERVICE: HCPCS | Performed by: NURSE PRACTITIONER

## 2019-01-02 PROCEDURE — 85730 THROMBOPLASTIN TIME PARTIAL: CPT

## 2019-01-02 PROCEDURE — 97165 OT EVAL LOW COMPLEX 30 MIN: CPT

## 2019-01-02 PROCEDURE — 36415 COLL VENOUS BLD VENIPUNCTURE: CPT

## 2019-01-02 PROCEDURE — 700102 HCHG RX REV CODE 250 W/ 637 OVERRIDE(OP): Performed by: SURGERY

## 2019-01-02 RX ORDER — OXYCODONE HYDROCHLORIDE 10 MG/1
5-10 TABLET ORAL EVERY 4 HOURS PRN
Qty: 40 TAB | Refills: 0 | Status: SHIPPED | OUTPATIENT
Start: 2019-01-02 | End: 2019-01-09

## 2019-01-02 RX ORDER — LIDOCAINE 50 MG/G
2 PATCH TOPICAL EVERY 24 HOURS
Qty: 10 PATCH | Refills: 0 | Status: SHIPPED | OUTPATIENT
Start: 2019-01-02 | End: 2021-02-16

## 2019-01-02 RX ORDER — CLOPIDOGREL BISULFATE 75 MG/1
75 TABLET ORAL DAILY
Qty: 30 TAB | Refills: 0 | Status: ON HOLD | OUTPATIENT
Start: 2019-01-03 | End: 2019-07-16

## 2019-01-02 RX ADMIN — OXYCODONE HYDROCHLORIDE 10 MG: 5 TABLET ORAL at 04:37

## 2019-01-02 RX ADMIN — CLOPIDOGREL 75 MG: 75 TABLET, FILM COATED ORAL at 04:37

## 2019-01-02 RX ADMIN — OXYCODONE HYDROCHLORIDE 5 MG: 5 TABLET ORAL at 00:17

## 2019-01-02 ASSESSMENT — COGNITIVE AND FUNCTIONAL STATUS - GENERAL
HELP NEEDED FOR BATHING: A LITTLE
DAILY ACTIVITIY SCORE: 23
SUGGESTED CMS G CODE MODIFIER DAILY ACTIVITY: CI

## 2019-01-02 ASSESSMENT — PAIN SCALES - GENERAL
PAINLEVEL_OUTOF10: 5
PAINLEVEL_OUTOF10: 6

## 2019-01-02 ASSESSMENT — ACTIVITIES OF DAILY LIVING (ADL): TOILETING: INDEPENDENT

## 2019-01-02 ASSESSMENT — ENCOUNTER SYMPTOMS
PSYCHIATRIC NEGATIVE: 1
NEUROLOGICAL NEGATIVE: 1
NECK PAIN: 1
ROS GI COMMENTS: BM 1/1
MYALGIAS: 1

## 2019-01-02 NOTE — PROGRESS NOTES
Received bedside report and assumed care at 1900    Pt is A&O x4  Pain 9/10, medicated per MAR  Denies nausea  Tolerating regular diet  + Urine Output  + Flatus  + BM  Ortho boot to right foot  Up with standby assistance and FWW   Bed in lowest position and locked.  Reviewed plan of care with patient, pt resting comfortably now, call light within reach, all needs met at this time

## 2019-01-02 NOTE — CARE PLAN
Problem: Fluid Volume:  Goal: Will maintain balanced intake and output  Outcome: PROGRESSING AS EXPECTED  Patient drinking PO fluids and having appropriate urine output

## 2019-01-02 NOTE — DISCHARGE PLANNING
LSW placed call to pharmacy regarding RX Plavix, LSW informed RX is covered by [employer], no copay required.
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
## 2019-01-02 NOTE — PROGRESS NOTES
"   Orthopaedic Progress Note    Interval changes:  Doing well post op  Dressings CDI  Cleared for DC by ortho pending trauma clearance    ROS - Patient denies any new issues.  Pain well controlled.    Blood pressure 131/95, pulse 87, temperature 36.7 °C (98.1 °F), temperature source Temporal, resp. rate 17, height 1.651 m (5' 5\"), weight 90.6 kg (199 lb 11.8 oz), last menstrual period 12/02/2018, SpO2 99 %, not currently breastfeeding.      Patient seen and examined  No acute distress  Breathing non labored  RRR  Right foot Surgical dressing is clean, dry, and intact. Patient clearly fires tibialis anterior, EHL, and gastrocnemius/soleus. Sensation is intact to light touch throughout superficial peroneal, deep peroneal, tibial, saphenous, and sural nerve distributions. Strong and palpable 2+ dorsalis pedis and posterior tibial pulses with capillary refill less than 2 seconds. No lower leg tenderness or discomfort.        Recent Labs      12/30/18   0323  12/31/18   0249   WBC  6.7  7.1   RBC  3.59*  3.55*   HEMOGLOBIN  9.7*  9.5*   HEMATOCRIT  30.7*  30.6*   MCV  85.5  86.2   MCH  27.0  26.8*   MCHC  31.6*  31.0*   RDW  47.1  48.2   PLATELETCT  289  285   MPV  10.4  10.0       Active Hospital Problems    Diagnosis   • Carotid artery dissection (HCC) [I77.71]     Priority: High   • Lisfranc dislocation, right, initial encounter [S93.324A]     Priority: High   • Lumbar transverse process fracture (HCC) [S32.009A]     Priority: High   • No contraindication to deep vein thrombosis (DVT) prophylaxis [Z78.9]     Priority: Low   • Trauma [T14.90XA]     Priority: Low   • Phalanx, distal fracture of finger [S61.947I]     Priority: Low       Assessment/Plan:  Doing well post op  Cleared for DC by ortho pending trauma clearance  POD#1 S/P:   1.  Open treatment with internal fixation of right foot intertarsal fracture  dislocation (medial intercuneiform joint).  2.  Open treatment with internal fixation of right foot " intertarsal dislocation (medial cuneiform and navicular joint).  3.  Open treatment with internal fixation of right foot intertarsal dislocation (intermediate cuneiform and navicular joint).  4.  Open treatment with internal fixation of right foot tarsometatarsal fracture dislocation (medial cuneiform to second metatarsal base).  Wt bearing status - NWB RLE  Wound care/Drains - dressing left in place  Future Procedures - none planned   Sutures/Staples out- 10-14 days post operatively  PT/OT-initiated  Antibiotics: completed  DVT Prophylaxis- TEDS/SCDs/Foot pumps/plavix  Gabriel-none  Case Coordination for Discharge Planning - Disposition home

## 2019-01-02 NOTE — PROGRESS NOTES
Dc education complete, walker delivered to bedside, IVs removed, pt has own clothes, scripts in hand. Father here to  her home. Pt was then wheeled off unit, no complications with DC

## 2019-01-02 NOTE — DISCHARGE INSTRUCTIONS
- Follow up with Dr. Neville in 2 weeks time   - Follow up with Dr. Olguin in 1-2 weeks time   - Follow up with primary care provider within one weeks time   - Resume regular diet   - May take over the counter acetaminophen as needed for pain   - Continue daily over the counter stool softener while on narcotics   - No operation of machinery or motorized vehicles while under the influence of narcotics   - No alcohol, marijuana or illicit drug use while under the influence of narcotics   - No swimming, hot tubs, baths or wound submersion until cleared by outpatient provider. May shower   - No contact sports, strenuous activities, or heavy lifting until cleared by outpatient provider   - If respiratory decompensation, change in condition or worsening condition, or signs or symptoms of infection occur seek medical attention    Discharge Instructions    Discharged to home by car with relative. Discharged via wheelchair, hospital escort: Yes.  Special equipment needed: Not Applicable    Be sure to schedule a follow-up appointment with your primary care doctor or any specialists as instructed.     Discharge Plan:   Influenza Vaccine Indication: Not indicated: Previously immunized this influenza season and > 8 years of age    I understand that a diet low in cholesterol, fat, and sodium is recommended for good health. Unless I have been given specific instructions below for another diet, I accept this instruction as my diet prescription.   Other diet: regular    Special Instructions: None    · Is patient discharged on Warfarin / Coumadin?   No     Depression / Suicide Risk    As you are discharged from this [facility] facility, it is important to learn how to keep safe from harming yourself.    Recognize the warning signs:  · Abrupt changes in personality, positive or negative- including increase in energy   · Giving away possessions  · Change in eating patterns- significant weight changes-  positive or  negative  · Change in sleeping patterns- unable to sleep or sleeping all the time   · Unwillingness or inability to communicate  · Depression  · Unusual sadness, discouragement and loneliness  · Talk of wanting to die  · Neglect of personal appearance   · Rebelliousness- reckless behavior  · Withdrawal from people/activities they love  · Confusion- inability to concentrate     If you or a loved one observes any of these behaviors or has concerns about self-harm, here's what you can do:  · Talk about it- your feelings and reasons for harming yourself  · Remove any means that you might use to hurt yourself (examples: pills, rope, extension cords, firearm)  · Get professional help from the community (Mental Health, Substance Abuse, psychological counseling)  · Do not be alone:Call your Safe Contact- someone whom you trust who will be there for you.  · Call your local CRISIS HOTLINE 439-8894 or 539-318-6495  · Call your local Children's Mobile Crisis Response Team [place] (471) 354-0872 or www.iCrederity  · Call the toll free National Suicide Prevention Hotlines   · National Suicide Prevention Lifeline 373-651-IQMQ (3974)  · National Hope Line Network 800-SUICIDE (186-9306)
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
## 2019-01-02 NOTE — DISCHARGE PLANNING
Anticipated Discharge Disposition: Home.     Action: LSW received a script for Plavix.  LSW faxed the script to the [facility] in [place], per patient's request.    Barriers to Discharge: None.    Plan: Home.  Awaiting call back from the [facility] to establish if prior authorization for Plavix is required.
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
## 2019-01-02 NOTE — PROGRESS NOTES
Vascular    Seen and examined yesterday 1/1/19  Feeling well  No neuro deficits (right foot in boot after surgery so limited exam here)    Recommend Plavix 75mg daily for the carotid dissection  Follow-up outpatient in 2-4 weeks to discuss reimaging and duration of antiplatelet therapy    Leonardo Olguin MD  [facility] (General and Vascular Surgery)  Cell: [phone] (text or call is fine, if you don't reach me please try my office)  Office: [phone]  __________________________________________________________________  Patient:Kalina Collier   MRN:[id number]   CSN:[id number]    1/2/2019    9:12 AM
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
## 2019-07-16 ENCOUNTER — APPOINTMENT (OUTPATIENT)
Dept: RADIOLOGY | Facility: MEDICAL CENTER | Age: 34
End: 2019-07-16
Attending: ORTHOPAEDIC SURGERY
Payer: COMMERCIAL

## 2019-07-16 ENCOUNTER — HOSPITAL ENCOUNTER (OUTPATIENT)
Facility: MEDICAL CENTER | Age: 34
End: 2019-07-16
Attending: ORTHOPAEDIC SURGERY | Admitting: ORTHOPAEDIC SURGERY
Payer: COMMERCIAL

## 2019-07-16 ENCOUNTER — ANESTHESIA (OUTPATIENT)
Dept: SURGERY | Facility: MEDICAL CENTER | Age: 34
End: 2019-07-16
Payer: COMMERCIAL

## 2019-07-16 ENCOUNTER — ANESTHESIA EVENT (OUTPATIENT)
Dept: SURGERY | Facility: MEDICAL CENTER | Age: 34
End: 2019-07-16
Payer: COMMERCIAL

## 2019-07-16 VITALS
RESPIRATION RATE: 18 BRPM | SYSTOLIC BLOOD PRESSURE: 125 MMHG | WEIGHT: 206.13 LBS | TEMPERATURE: 97 F | BODY MASS INDEX: 34.34 KG/M2 | DIASTOLIC BLOOD PRESSURE: 89 MMHG | OXYGEN SATURATION: 98 % | HEART RATE: 84 BPM | HEIGHT: 65 IN

## 2019-07-16 DIAGNOSIS — G89.18 ACUTE POSTOPERATIVE PAIN OF EXTREMITY: ICD-10-CM

## 2019-07-16 LAB
HCG UR QL: NEGATIVE
SP GR UR REFRACTOMETRY: 1.02

## 2019-07-16 PROCEDURE — A9270 NON-COVERED ITEM OR SERVICE: HCPCS

## 2019-07-16 PROCEDURE — 700105 HCHG RX REV CODE 258: Performed by: ORTHOPAEDIC SURGERY

## 2019-07-16 PROCEDURE — 700111 HCHG RX REV CODE 636 W/ 250 OVERRIDE (IP)

## 2019-07-16 PROCEDURE — 160046 HCHG PACU - 1ST 60 MINS PHASE II: Performed by: ORTHOPAEDIC SURGERY

## 2019-07-16 PROCEDURE — 81025 URINE PREGNANCY TEST: CPT

## 2019-07-16 PROCEDURE — 160048 HCHG OR STATISTICAL LEVEL 1-5: Performed by: ORTHOPAEDIC SURGERY

## 2019-07-16 PROCEDURE — 160025 RECOVERY II MINUTES (STATS): Performed by: ORTHOPAEDIC SURGERY

## 2019-07-16 PROCEDURE — 73620 X-RAY EXAM OF FOOT: CPT | Mod: RT

## 2019-07-16 PROCEDURE — 160009 HCHG ANES TIME/MIN: Performed by: ORTHOPAEDIC SURGERY

## 2019-07-16 PROCEDURE — 700101 HCHG RX REV CODE 250: Performed by: ORTHOPAEDIC SURGERY

## 2019-07-16 PROCEDURE — 500881 HCHG PACK, EXTREMITY: Performed by: ORTHOPAEDIC SURGERY

## 2019-07-16 PROCEDURE — 501838 HCHG SUTURE GENERAL: Performed by: ORTHOPAEDIC SURGERY

## 2019-07-16 PROCEDURE — 700111 HCHG RX REV CODE 636 W/ 250 OVERRIDE (IP): Performed by: ANESTHESIOLOGY

## 2019-07-16 PROCEDURE — 160002 HCHG RECOVERY MINUTES (STAT): Performed by: ORTHOPAEDIC SURGERY

## 2019-07-16 PROCEDURE — 160039 HCHG SURGERY MINUTES - EA ADDL 1 MIN LEVEL 3: Performed by: ORTHOPAEDIC SURGERY

## 2019-07-16 PROCEDURE — 700101 HCHG RX REV CODE 250: Performed by: ANESTHESIOLOGY

## 2019-07-16 PROCEDURE — 700102 HCHG RX REV CODE 250 W/ 637 OVERRIDE(OP)

## 2019-07-16 PROCEDURE — 160035 HCHG PACU - 1ST 60 MINS PHASE I: Performed by: ORTHOPAEDIC SURGERY

## 2019-07-16 PROCEDURE — 160028 HCHG SURGERY MINUTES - 1ST 30 MINS LEVEL 3: Performed by: ORTHOPAEDIC SURGERY

## 2019-07-16 PROCEDURE — 700105 HCHG RX REV CODE 258: Performed by: ANESTHESIOLOGY

## 2019-07-16 RX ORDER — SODIUM CHLORIDE, SODIUM LACTATE, POTASSIUM CHLORIDE, CALCIUM CHLORIDE 600; 310; 30; 20 MG/100ML; MG/100ML; MG/100ML; MG/100ML
INJECTION, SOLUTION INTRAVENOUS CONTINUOUS
Status: DISCONTINUED | OUTPATIENT
Start: 2019-07-16 | End: 2019-07-16 | Stop reason: HOSPADM

## 2019-07-16 RX ORDER — HYDROMORPHONE HYDROCHLORIDE 1 MG/ML
0.2 INJECTION, SOLUTION INTRAMUSCULAR; INTRAVENOUS; SUBCUTANEOUS
Status: DISCONTINUED | OUTPATIENT
Start: 2019-07-16 | End: 2019-07-16 | Stop reason: HOSPADM

## 2019-07-16 RX ORDER — MIDAZOLAM HYDROCHLORIDE 1 MG/ML
1 INJECTION INTRAMUSCULAR; INTRAVENOUS
Status: DISCONTINUED | OUTPATIENT
Start: 2019-07-16 | End: 2019-07-16 | Stop reason: HOSPADM

## 2019-07-16 RX ORDER — HYDROCODONE BITARTRATE AND ACETAMINOPHEN 10; 325 MG/1; MG/1
.5-1 TABLET ORAL EVERY 4 HOURS PRN
Qty: 42 TAB | Refills: 0 | Status: SHIPPED | OUTPATIENT
Start: 2019-07-16 | End: 2019-07-23

## 2019-07-16 RX ORDER — OXYCODONE HCL 5 MG/5 ML
5 SOLUTION, ORAL ORAL
Status: COMPLETED | OUTPATIENT
Start: 2019-07-16 | End: 2019-07-16

## 2019-07-16 RX ORDER — OXYCODONE HCL 5 MG/5 ML
SOLUTION, ORAL ORAL
Status: COMPLETED
Start: 2019-07-16 | End: 2019-07-16

## 2019-07-16 RX ORDER — CEFAZOLIN SODIUM 1 G/3ML
INJECTION, POWDER, FOR SOLUTION INTRAMUSCULAR; INTRAVENOUS PRN
Status: DISCONTINUED | OUTPATIENT
Start: 2019-07-16 | End: 2019-07-16 | Stop reason: SURG

## 2019-07-16 RX ORDER — HYDROMORPHONE HYDROCHLORIDE 1 MG/ML
0.4 INJECTION, SOLUTION INTRAMUSCULAR; INTRAVENOUS; SUBCUTANEOUS
Status: DISCONTINUED | OUTPATIENT
Start: 2019-07-16 | End: 2019-07-16 | Stop reason: HOSPADM

## 2019-07-16 RX ORDER — SODIUM CHLORIDE, SODIUM LACTATE, POTASSIUM CHLORIDE, CALCIUM CHLORIDE 600; 310; 30; 20 MG/100ML; MG/100ML; MG/100ML; MG/100ML
INJECTION, SOLUTION INTRAVENOUS
Status: DISCONTINUED | OUTPATIENT
Start: 2019-07-16 | End: 2019-07-16 | Stop reason: SURG

## 2019-07-16 RX ORDER — OXYCODONE HCL 5 MG/5 ML
10 SOLUTION, ORAL ORAL
Status: COMPLETED | OUTPATIENT
Start: 2019-07-16 | End: 2019-07-16

## 2019-07-16 RX ORDER — BUPIVACAINE HYDROCHLORIDE AND EPINEPHRINE 5; 5 MG/ML; UG/ML
INJECTION, SOLUTION EPIDURAL; INTRACAUDAL; PERINEURAL
Status: DISCONTINUED | OUTPATIENT
Start: 2019-07-16 | End: 2019-07-16 | Stop reason: HOSPADM

## 2019-07-16 RX ORDER — ONDANSETRON 2 MG/ML
INJECTION INTRAMUSCULAR; INTRAVENOUS PRN
Status: DISCONTINUED | OUTPATIENT
Start: 2019-07-16 | End: 2019-07-16 | Stop reason: SURG

## 2019-07-16 RX ORDER — MEPERIDINE HYDROCHLORIDE 25 MG/ML
12.5 INJECTION INTRAMUSCULAR; INTRAVENOUS; SUBCUTANEOUS
Status: DISCONTINUED | OUTPATIENT
Start: 2019-07-16 | End: 2019-07-16 | Stop reason: HOSPADM

## 2019-07-16 RX ORDER — KETOROLAC TROMETHAMINE 30 MG/ML
INJECTION, SOLUTION INTRAMUSCULAR; INTRAVENOUS PRN
Status: DISCONTINUED | OUTPATIENT
Start: 2019-07-16 | End: 2019-07-16 | Stop reason: SURG

## 2019-07-16 RX ORDER — DIPHENHYDRAMINE HYDROCHLORIDE 50 MG/ML
12.5 INJECTION INTRAMUSCULAR; INTRAVENOUS
Status: DISCONTINUED | OUTPATIENT
Start: 2019-07-16 | End: 2019-07-16 | Stop reason: HOSPADM

## 2019-07-16 RX ORDER — DEXAMETHASONE SODIUM PHOSPHATE 4 MG/ML
INJECTION, SOLUTION INTRA-ARTICULAR; INTRALESIONAL; INTRAMUSCULAR; INTRAVENOUS; SOFT TISSUE PRN
Status: DISCONTINUED | OUTPATIENT
Start: 2019-07-16 | End: 2019-07-16 | Stop reason: SURG

## 2019-07-16 RX ORDER — HYDROMORPHONE HYDROCHLORIDE 1 MG/ML
0.1 INJECTION, SOLUTION INTRAMUSCULAR; INTRAVENOUS; SUBCUTANEOUS
Status: DISCONTINUED | OUTPATIENT
Start: 2019-07-16 | End: 2019-07-16 | Stop reason: HOSPADM

## 2019-07-16 RX ORDER — ONDANSETRON 2 MG/ML
4 INJECTION INTRAMUSCULAR; INTRAVENOUS
Status: DISCONTINUED | OUTPATIENT
Start: 2019-07-16 | End: 2019-07-16 | Stop reason: HOSPADM

## 2019-07-16 RX ORDER — HYDRALAZINE HYDROCHLORIDE 20 MG/ML
5 INJECTION INTRAMUSCULAR; INTRAVENOUS
Status: DISCONTINUED | OUTPATIENT
Start: 2019-07-16 | End: 2019-07-16 | Stop reason: HOSPADM

## 2019-07-16 RX ORDER — HALOPERIDOL 5 MG/ML
1 INJECTION INTRAMUSCULAR
Status: DISCONTINUED | OUTPATIENT
Start: 2019-07-16 | End: 2019-07-16 | Stop reason: HOSPADM

## 2019-07-16 RX ADMIN — FENTANYL CITRATE 50 MCG: 50 INJECTION, SOLUTION INTRAMUSCULAR; INTRAVENOUS at 12:45

## 2019-07-16 RX ADMIN — Medication 10 MG: at 12:44

## 2019-07-16 RX ADMIN — FENTANYL CITRATE 50 MCG: 50 INJECTION, SOLUTION INTRAMUSCULAR; INTRAVENOUS at 11:50

## 2019-07-16 RX ADMIN — LIDOCAINE HYDROCHLORIDE 100 MG: 20 INJECTION, SOLUTION INTRAVENOUS at 11:31

## 2019-07-16 RX ADMIN — DEXAMETHASONE SODIUM PHOSPHATE 4 MG: 4 INJECTION, SOLUTION INTRA-ARTICULAR; INTRALESIONAL; INTRAMUSCULAR; INTRAVENOUS; SOFT TISSUE at 11:35

## 2019-07-16 RX ADMIN — FENTANYL CITRATE 25 MCG: 50 INJECTION, SOLUTION INTRAMUSCULAR; INTRAVENOUS at 12:17

## 2019-07-16 RX ADMIN — FENTANYL CITRATE 50 MCG: 50 INJECTION, SOLUTION INTRAMUSCULAR; INTRAVENOUS at 11:52

## 2019-07-16 RX ADMIN — FENTANYL CITRATE 50 MCG: 50 INJECTION, SOLUTION INTRAMUSCULAR; INTRAVENOUS at 12:56

## 2019-07-16 RX ADMIN — ONDANSETRON 4 MG: 2 INJECTION INTRAMUSCULAR; INTRAVENOUS at 12:17

## 2019-07-16 RX ADMIN — OXYCODONE HYDROCHLORIDE 10 MG: 5 SOLUTION ORAL at 12:44

## 2019-07-16 RX ADMIN — SODIUM CHLORIDE, POTASSIUM CHLORIDE, SODIUM LACTATE AND CALCIUM CHLORIDE: 600; 310; 30; 20 INJECTION, SOLUTION INTRAVENOUS at 10:33

## 2019-07-16 RX ADMIN — FENTANYL CITRATE 50 MCG: 50 INJECTION, SOLUTION INTRAMUSCULAR; INTRAVENOUS at 11:29

## 2019-07-16 RX ADMIN — CEFAZOLIN 2 G: 330 INJECTION, POWDER, FOR SOLUTION INTRAMUSCULAR; INTRAVENOUS at 11:32

## 2019-07-16 RX ADMIN — KETOROLAC TROMETHAMINE 30 MG: 30 INJECTION, SOLUTION INTRAMUSCULAR at 11:43

## 2019-07-16 RX ADMIN — PROPOFOL 200 MG: 10 INJECTION, EMULSION INTRAVENOUS at 11:31

## 2019-07-16 RX ADMIN — FENTANYL CITRATE 50 MCG: 0.05 INJECTION, SOLUTION INTRAMUSCULAR; INTRAVENOUS at 12:45

## 2019-07-16 RX ADMIN — SODIUM CHLORIDE, POTASSIUM CHLORIDE, SODIUM LACTATE AND CALCIUM CHLORIDE: 600; 310; 30; 20 INJECTION, SOLUTION INTRAVENOUS at 11:21

## 2019-07-16 RX ADMIN — MIDAZOLAM HYDROCHLORIDE 2 MG: 1 INJECTION, SOLUTION INTRAMUSCULAR; INTRAVENOUS at 11:21

## 2019-07-16 NOTE — OR SURGEON
Immediate Post OP Note    PreOp Diagnosis: Right foot retained, symptomatic deep implants    PostOp Diagnosis: same    Procedure(s):  HARDWARE REMOVAL ORTHO- FOOT AND OTHER INDICATED PROCEDURES - Wound Class: Clean    Surgeon(s):  Yoandy Neville M.D.    Anesthesiologist/Type of Anesthesia:  Anesthesiologist: Alireza Canas M.D./General    Surgical Staff:  Circulator: Sandee Ugalde, Student; Darcy Rivera R.N.  Scrub Person: Nixon Lundberg  Radiology Technologist: Klarissa Richter    Specimens removed if any:  * No specimens in log *    Estimated Blood Loss: minimal    Findings: see dictation    Complications: none known    PLAN:  --discharge to home from PACU  --WBAT RLE in boot  --okay to change dressing if needed after 72hrs, keep incision clean, dry and covered otherwise  --Rx for norco PRN pain  --fu 10-14 days postop        7/16/2019 12:39 PM Yoandy Neville M.D.

## 2019-07-16 NOTE — ANESTHESIA PROCEDURE NOTES
Airway  Date/Time: 7/16/2019 11:32 AM  Performed by: MONA PETERS  Authorized by: MONA PETERS     Location:  OR  Urgency:  Elective  Indications for Airway Management:  Anesthesia  Spontaneous Ventilation: absent    Sedation Level:  Deep  Preoxygenated: Yes    Final Airway Type:  Supraglottic airway  Final Supraglottic Airway:  Standard LMA  SGA Size:  4  Number of Attempts at Approach:  1

## 2019-07-16 NOTE — ANESTHESIA TIME REPORT
Anesthesia Start and Stop Event Times     Date Time Event    7/16/2019 1121 Anesthesia Start     1235 Anesthesia Stop        Responsible Staff  07/16/19    Name Role Begin End    Alireza Canas M.D. Anesth 1121 1235        Preop Diagnosis (Free Text):  Pre-op Diagnosis     CLOSED FRACTURE DISLOCATION OF TARSOMETATARSAL JOINT         Preop Diagnosis (Codes):  Diagnosis Information     Diagnosis Code(s):         Post op Diagnosis  Orthopedic hardware present      Premium Reason  Non-Premium    Comments:

## 2019-07-16 NOTE — ANESTHESIA PREPROCEDURE EVALUATION
Relevant Problems   No relevant active problems   H/O PONV    Physical Exam    Airway   Mallampati: II  TM distance: >3 FB  Neck ROM: full       Cardiovascular - normal exam  Rhythm: regular  Rate: normal  (-) murmur     Dental - normal exam         Pulmonary - normal exam  Breath sounds clear to auscultation     Abdominal    Neurological - normal exam                 Anesthesia Plan    ASA 1       Plan - general       Airway plan will be LMA        Induction: intravenous    Postoperative Plan: Postoperative administration of opioids is intended.    Pertinent diagnostic labs and testing reviewed    Informed Consent:    Anesthetic plan and risks discussed with patient.    Use of blood products discussed with: patient whom consented to blood products.

## 2019-07-16 NOTE — OR NURSING
"Rec pt at 1325 - A&O states pain is 6/10 and is \"tolerable\", right ankle dressing CD&I.  Assisted OOB to BR and voided.  Back to room, clothes changed.  Family at bedside.  DC instructions reviewed.  Questions answered.  Hardware given to patient.  DC criteria met.  DC to family at 1407.  "

## 2019-07-16 NOTE — OR NURSING
Pre-op complete, POC reviewed with pt and family. Call light within reach, educated to call for assistance if needed. Hourly rounding in place. HCG negative. No multimodals ordered at this time.

## 2019-07-16 NOTE — DISCHARGE INSTRUCTIONS
ACTIVITY: Rest and take it easy for the first 24 hours.  A responsible adult is recommended to remain with you during that time.  It is normal to feel sleepy.  We encourage you to not do anything that requires balance, judgment or coordination.    MILD FLU-LIKE SYMPTOMS ARE NORMAL. YOU MAY EXPERIENCE GENERALIZED MUSCLE ACHES, THROAT IRRITATION, HEADACHE AND/OR SOME NAUSEA.    FOR 24 HOURS DO NOT:  Drive, operate machinery or run household appliances.  Drink beer or alcoholic beverages.   Make important decisions or sign legal documents.    SPECIAL INSTRUCTIONS:     Weight bearing Right Lower Extremity  in boot  okay to change dressing if needed after 72hrs, keep incision clean, dry and covered otherwise  Prescription for norco PRN pain  Follow up 10-14 days postop    DIET: To avoid nausea, slowly advance diet as tolerated, avoiding spicy or greasy foods for the first day.  Add more substantial food to your diet according to your physician's instructions.   INCREASE FLUIDS AND FIBER TO AVOID CONSTIPATION.    SURGICAL DRESSING/BATHING: okay to change dressing if needed after 72hrs, keep incision clean, dry and covered otherwise    FOLLOW-UP APPOINTMENT:  A follow-up appointment should be arranged with your doctor in 10-14 days; call to schedule  DR. HOLT [phone].    You should CALL YOUR PHYSICIAN if you develop:  Fever greater than 101 degrees F.  Pain not relieved by medication, or persistent nausea or vomiting.  Excessive bleeding (blood soaking through dressing) or unexpected drainage from the wound.  Extreme redness or swelling around the incision site, drainage of pus or foul smelling drainage.  Inability to urinate or empty your bladder within 8 hours.  Problems with breathing or chest pain.    You should call 911 if you develop problems with breathing or chest pain.  If you are unable to contact your doctor or surgical center, you should go to the nearest emergency room or urgent care center.   Physician's telephone #: DR. HOLT [phone]    If any questions arise, call your doctor.  If your doctor is not available, please feel free to call the Surgical Center at [phone].  The Center is open Monday through Friday from 7AM to 7PM.  You can also call the HEALTH HOTLINE open 24 hours/day, 7 days/week and speak to a nurse at [phone], or toll free at [phone].    A registered nurse may call you a few days after your surgery to see how you are doing after your procedure.    MEDICATIONS: Resume taking daily medication.  Take prescribed pain medication with food.  If no medication is prescribed, you may take non-aspirin pain medication if needed.  PAIN MEDICATION CAN BE VERY CONSTIPATING.  Take a stool softener or laxative such as senokot, pericolace, or milk of magnesia if needed.    Prescription given for Norco.  Last pain medication given at 12:44.    If your physician has prescribed pain medication that includes Acetaminophen (Tylenol), do not take additional Acetaminophen (Tylenol) while taking the prescribed medication.    Depression / Suicide Risk    As you are discharged from this [facility] facility, it is important to learn how to keep safe from harming yourself.    Recognize the warning signs:  · Abrupt changes in personality, positive or negative- including increase in energy   · Giving away possessions  · Change in eating patterns- significant weight changes-  positive or negative  · Change in sleeping patterns- unable to sleep or sleeping all the time   · Unwillingness or inability to communicate  · Depression  · Unusual sadness, discouragement and loneliness  · Talk of wanting to die  · Neglect of personal appearance   · Rebelliousness- reckless behavior  · Withdrawal from people/activities they love  · Confusion- inability to concentrate     If you or a loved one observes any of these behaviors or has concerns about self-harm, here's what you can do:  · Talk about it-  your feelings and reasons for harming yourself  · Remove any means that you might use to hurt yourself (examples: pills, rope, extension cords, firearm)  · Get professional help from the community (Mental Health, Substance Abuse, psychological counseling)  · Do not be alone:Call your Safe Contact- someone whom you trust who will be there for you.  · Call your local CRISIS HOTLINE 975-6637 or 283-355-2452  · Call your local Children's Mobile Crisis Response Team [place] (458) 772-7160 or www.[employer]  · Call the toll free National Suicide Prevention Hotlines   · National Suicide Prevention Lifeline 939-193-FILW (2437)  · National Hope Line Network 800-SUICIDE (545-3963)

## 2019-07-16 NOTE — ANESTHESIA QCDR
2019 [facility] Clinical Data Registry (for Quality Improvement)     Postoperative nausea/vomiting risk protocol (Adult = 18 yrs and Pediatric 3-17 yrs)- (430 and 463)  General inhalation anesthetic (NOT TIVA) with PONV risk factors: Yes  Provision of anti-emetic therapy with at least 2 different classes of agents: Yes   Patient DID NOT receive anti-emetic therapy and reason is documented in Medical Record:  N/A    Multimodal Pain Management- (AQI59)  Patient undergoing Elective Surgery (i.e. Outpatient, or ASC, or Prescheduled Surgery prior to Hospital Admission): Yes  Use of Multimodal Pain Management, two or more drugs and/or interventions, NOT including systemic opioids: Yes   Exception: Documented allergy to multiple classes of analgesics:  N/A    PACU assessment of acute postoperative pain prior to Anesthesia Care End- Applies to Patients Age = 18- (ABG7)  Initial PACU pain score is which of the following: < 7/10  Patient unable to report pain score: N/A    Post-anesthetic transfer of care checklist/protocol to PACU/ICU- (426 and 427)  Upon conclusion of case, patient transferred to which of the following locations: PACU/Non-ICU  Use of transfer checklist/protocol: Yes  Exclusion: Service Performed in Patient Hospital Room (and thus did not require transfer): N/A    PACU Reintubation- (AQI31)  General anesthesia requiring endotracheal intubation (ETT) along with subsequent extubation in OR or PACU:   Required reintubation in the PACU:   Extubation was a planned trial documented in the medical record prior to removal of the original airway device:     Unplanned admission to ICU related to anesthesia service up through end of PACU care- (MD51)  Unplanned admission to ICU (not initially anticipated at anesthesia start time): No
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
## 2019-07-29 ASSESSMENT — PAIN SCALES - GENERAL: PAIN_LEVEL: 2

## 2019-07-29 NOTE — ANESTHESIA POSTPROCEDURE EVALUATION
Patient: Kalina Collier    Procedure Summary     Date:  07/16/19 Room / Location:  [place] [address] / SURGERY [facility] [place]    Anesthesia Start:  1121 Anesthesia Stop:  1235    Procedure:  HARDWARE REMOVAL ORTHO- FOOT AND OTHER INDICATED PROCEDURES (Right Foot) Diagnosis:  (CLOSED FRACTURE DISLOCATION OF TARSOMETATARSAL JOINT )    Surgeon:  Yoandy Neville M.D. Responsible Provider:  Alireza Caans M.D.    Anesthesia Type:  general ASA Status:  1          Final Anesthesia Type: general  Last vitals  /70       Temp 36.5       Pulse 78      Resp 16       SpO2 96%         Anesthesia Post Evaluation    Patient location during evaluation: PACU  Patient participation: complete - patient participated  Level of consciousness: awake and alert  Pain score: 2    Airway patency: patent  Anesthetic complications: no  Cardiovascular status: hemodynamically stable  Respiratory status: acceptable  Hydration status: euvolemic    PONV: none

## 2019-10-20 ENCOUNTER — HOSPITAL ENCOUNTER (EMERGENCY)
Facility: MEDICAL CENTER | Age: 34
End: 2019-10-20
Payer: COMMERCIAL

## 2020-06-15 ENCOUNTER — HOSPITAL ENCOUNTER (EMERGENCY)
Facility: MEDICAL CENTER | Age: 35
End: 2020-06-15
Attending: EMERGENCY MEDICINE
Payer: COMMERCIAL

## 2020-06-15 VITALS
OXYGEN SATURATION: 96 % | BODY MASS INDEX: 35.19 KG/M2 | SYSTOLIC BLOOD PRESSURE: 134 MMHG | HEIGHT: 65 IN | WEIGHT: 211.2 LBS | TEMPERATURE: 97 F | HEART RATE: 89 BPM | DIASTOLIC BLOOD PRESSURE: 101 MMHG | RESPIRATION RATE: 16 BRPM

## 2020-06-15 DIAGNOSIS — S39.012A STRAIN OF LUMBAR REGION, INITIAL ENCOUNTER: ICD-10-CM

## 2020-06-15 PROCEDURE — 99282 EMERGENCY DEPT VISIT SF MDM: CPT | Mod: EDC

## 2020-06-15 RX ORDER — CYCLOBENZAPRINE HCL 10 MG
10 TABLET ORAL 3 TIMES DAILY PRN
Qty: 10 TAB | Refills: 0 | Status: SHIPPED | OUTPATIENT
Start: 2020-06-15 | End: 2020-06-18

## 2020-06-15 RX ORDER — PREDNISONE 20 MG/1
60 TABLET ORAL DAILY
Qty: 15 TAB | Refills: 0 | Status: SHIPPED | OUTPATIENT
Start: 2020-06-15 | End: 2020-06-20

## 2020-06-15 RX ORDER — IBUPROFEN 200 MG
600 TABLET ORAL EVERY 6 HOURS PRN
COMMUNITY
End: 2021-02-16

## 2020-06-15 ASSESSMENT — FIBROSIS 4 INDEX: FIB4 SCORE: 1.418125640558682057

## 2020-06-15 NOTE — ED NOTES
Dr. Samuels to bedside.    Ears: no ear pain and no hearing problems.Nose: no nasal congestion and no nasal drainage.Mouth/Throat: no dysphagia, no hoarseness and no throat pain.Neck: no lumps, no pain, no stiffness and no swollen glands.

## 2020-06-15 NOTE — ED TRIAGE NOTES
"..  Chief Complaint   Patient presents with   • Low Back Pain     c/o of low back pain radiating bilaterally down sciatic nerve starting today. report neck pain and nausea.    ../101   Pulse 67   Temp 36.8 °C (98.3 °F) (Temporal)   Resp 16   Ht 1.651 m (5' 5\")   Wt 95.8 kg (211 lb 3.2 oz)   SpO2 96%   "

## 2020-06-15 NOTE — ED NOTES
Kalina GELLER/C'grupo.  Discharge instructions including s/s to return to ED, follow up appointments, hydration importance and medication administration provided to pt.    Pt verbalized understanding with no further questions and concerns.    Copy of discharge provided to pt.  Signed copy in chart.    Prescription for Flexeril and Prednisone provided to pt.   Pt walked out of department; pt in NAD, awake, alert, interactive and appropriate.

## 2020-06-15 NOTE — ED PROVIDER NOTES
ED Provider Note    CHIEF COMPLAINT  Chief Complaint   Patient presents with   • Low Back Pain     c/o of low back pain radiating bilaterally down sciatic nerve starting today. report neck pain and nausea.        HPI  Kalina Collier is a 34 y.o. female who presents low back pain.  Patient states the pain started this morning.  She states pain is in paraspinal muscles.  She also some pain radiate down both her legs.  She does not have any weakness to her legs.  She does not have any difficulty with bowel or bladder function.  She states the pain is severe in intensity and worse with certain movements.  She says she has had some back pain in the past.  She is unaware of any direct trauma.  She has not had any associated fevers.    REVIEW OF SYSTEMS  See HPI for further details. All other systems are negative.     PAST MEDICAL HISTORY  Past Medical History:   Diagnosis Date   • Back pain    • Hypertension        FAMILY HISTORY  [unfilled]    SOCIAL HISTORY  Social History     Socioeconomic History   • Marital status: Single     Spouse name: Not on file   • Number of children: Not on file   • Years of education: Not on file   • Highest education level: Not on file   Occupational History   • Not on file   Social Needs   • Financial resource strain: Not on file   • Food insecurity     Worry: Not on file     Inability: Not on file   • Transportation needs     Medical: Not on file     Non-medical: Not on file   Tobacco Use   • Smoking status: Never Smoker   • Smokeless tobacco: Never Used   Substance and Sexual Activity   • Alcohol use: No   • Drug use: No   • Sexual activity: Not on file   Lifestyle   • Physical activity     Days per week: Not on file     Minutes per session: Not on file   • Stress: Not on file   Relationships   • Social connections     Talks on phone: Not on file     Gets together: Not on file     Attends [employer] service: Not on file     Active member of club or organization: Not on file     Attends  "meetings of clubs or organizations: Not on file     Relationship status: Not on file   • Intimate partner violence     Fear of current or ex partner: Not on file     Emotionally abused: Not on file     Physically abused: Not on file     Forced sexual activity: Not on file   Other Topics Concern   • Not on file   Social History Narrative   • Not on file       SURGICAL HISTORY  Past Surgical History:   Procedure Laterality Date   • HARDWARE REMOVAL ORTHO Right 7/16/2019    Procedure: HARDWARE REMOVAL ORTHO- FOOT AND OTHER INDICATED PROCEDURES;  Surgeon: Yoandy Neville M.D.;  Location: SURGERY [facility];  Service: Orthopedics   • FOOT ORIF Right 12/31/2018    Procedure: FOOT ORIF;  Surgeon: Yoandy Neville M.D.;  Location: SURGERY [facility];  Service: Orthopedics   • GYN SURGERY      D&C approx 2003       CURRENT MEDICATIONS  Home Medications     Reviewed by Sandee Bonilla R.N. (Registered Nurse) on 06/15/20 at 1421  Med List Status: Partial   Medication Last Dose Status   ibuprofen (MOTRIN) 200 MG Tab 6/15/2020 Active   lidocaine (LIDODERM) 5 % Patch  Active                ALLERGIES  Allergies   Allergen Reactions   • Codeine Hives   • Darvocet [Propoxyphene N-Apap] Hives   • Penicillins Hives       PHYSICAL EXAM  VITAL SIGNS: /101   Pulse 67   Temp 36.8 °C (98.3 °F) (Temporal)   Resp 16   Ht 1.651 m (5' 5\")   Wt 95.8 kg (211 lb 3.2 oz)   SpO2 96%   BMI 35.15 kg/m²       Constitutional: Mild acute distress, Non-toxic appearance.   HENT: Normocephalic, Atraumatic, Bilateral external ears normal, Oropharynx moist, No oral exudates, Nose normal.   Eyes: PERRLA, EOMI, Conjunctiva normal, No discharge.   Neck: Normal range of motion, No tenderness, Supple, No stridor.   Lymphatic: No lymphadenopathy noted.   Cardiovascular: Normal heart rate, Normal rhythm, No murmurs, No rubs, No gallops.   Thorax & Lungs: Normal breath sounds, No respiratory distress, No wheezing, No chest tenderness. "   Abdomen: Bowel sounds normal, Soft, No tenderness, No masses, No pulsatile masses.   Skin: Warm, Dry, No erythema, No rash.   Back: Paraspinal muscle discomfort in the lumbar spine with no midline tenderness nor step-offs.   Extremities: Intact distal pulses, No edema, No tenderness, No cyanosis, No clubbing.   Musculoskeletal: Good range of motion in all major joints. No tenderness to palpation or major deformities noted.   Neurologic: Alert & oriented x 3, Normal motor function, Normal sensory function, No focal deficits noted.   Psychiatric: Affect normal, Judgment normal, Mood normal.     COURSE & MEDICAL DECISION MAKING  Pertinent Labs & Imaging studies reviewed. (See chart for details)  Is a 34-year-old female who presents the emerge department with low back discomfort.  The patient does have a reproducible component which would support a muscular etiology.  However the patient also has radicular pain and this could be from a herniated disc.  The patient is neurologically intact at this time.  She will be treated with a 5-day course of prednisone as well as a 2-day course of Flexeril.  She is instructed to follow-up with her primary care provider on Thursday.  She will return to the emerge department for increased pain, weakness to her lower extremity, or difficulty initiating her stream of urine.    FINAL IMPRESSION  1.  Low back pain    Disposition  The patient will be discharged in stable condition         Electronically signed by: Juan Samuels M.D., 6/15/2020 2:29 PM

## 2020-06-15 NOTE — ED NOTES
Pt ambulated to Peds 41. Pt asked to change into gown. Physical assessment completed. Call light within reach.

## 2020-11-24 ENCOUNTER — HOSPITAL ENCOUNTER (OUTPATIENT)
Dept: LAB | Facility: MEDICAL CENTER | Age: 35
End: 2020-11-24
Attending: ANESTHESIOLOGY
Payer: COMMERCIAL

## 2020-11-24 PROCEDURE — U0003 INFECTIOUS AGENT DETECTION BY NUCLEIC ACID (DNA OR RNA); SEVERE ACUTE RESPIRATORY SYNDROME CORONAVIRUS 2 (SARS-COV-2) (CORONAVIRUS DISEASE [COVID-19]), AMPLIFIED PROBE TECHNIQUE, MAKING USE OF HIGH THROUGHPUT TECHNOLOGIES AS DESCRIBED BY CMS-2020-01-R: HCPCS

## 2020-11-24 PROCEDURE — C9803 HOPD COVID-19 SPEC COLLECT: HCPCS

## 2020-11-25 LAB — COVID ORDER STATUS COVID19: NORMAL

## 2020-11-26 LAB
SARS-COV-2 RNA RESP QL NAA+PROBE: NOTDETECTED
SPECIMEN SOURCE: NORMAL

## 2021-02-16 ENCOUNTER — HOSPITAL ENCOUNTER (EMERGENCY)
Facility: MEDICAL CENTER | Age: 36
End: 2021-02-16
Attending: EMERGENCY MEDICINE
Payer: COMMERCIAL

## 2021-02-16 ENCOUNTER — APPOINTMENT (OUTPATIENT)
Dept: RADIOLOGY | Facility: MEDICAL CENTER | Age: 36
End: 2021-02-16
Attending: EMERGENCY MEDICINE
Payer: COMMERCIAL

## 2021-02-16 VITALS
SYSTOLIC BLOOD PRESSURE: 130 MMHG | HEART RATE: 67 BPM | DIASTOLIC BLOOD PRESSURE: 79 MMHG | HEIGHT: 65 IN | TEMPERATURE: 97.7 F | WEIGHT: 216.93 LBS | OXYGEN SATURATION: 100 % | RESPIRATION RATE: 14 BRPM | BODY MASS INDEX: 36.14 KG/M2

## 2021-02-16 DIAGNOSIS — K52.9 ENTERITIS: ICD-10-CM

## 2021-02-16 DIAGNOSIS — R10.30 LOWER ABDOMINAL PAIN: ICD-10-CM

## 2021-02-16 LAB
ALBUMIN SERPL BCP-MCNC: 4.6 G/DL (ref 3.2–4.9)
ALBUMIN/GLOB SERPL: 1.2 G/DL
ALP SERPL-CCNC: 118 U/L (ref 30–99)
ALT SERPL-CCNC: 9 U/L (ref 2–50)
ANION GAP SERPL CALC-SCNC: 12 MMOL/L (ref 7–16)
APPEARANCE UR: ABNORMAL
AST SERPL-CCNC: 13 U/L (ref 12–45)
BACTERIA #/AREA URNS HPF: ABNORMAL /HPF
BASOPHILS # BLD AUTO: 0.2 % (ref 0–1.8)
BASOPHILS # BLD: 0.02 K/UL (ref 0–0.12)
BILIRUB SERPL-MCNC: 0.2 MG/DL (ref 0.1–1.5)
BILIRUB UR QL STRIP.AUTO: NEGATIVE
BUN SERPL-MCNC: 8 MG/DL (ref 8–22)
CALCIUM SERPL-MCNC: 9.9 MG/DL (ref 8.5–10.5)
CHLORIDE SERPL-SCNC: 103 MMOL/L (ref 96–112)
CO2 SERPL-SCNC: 23 MMOL/L (ref 20–33)
COLOR UR: YELLOW
CREAT SERPL-MCNC: 0.62 MG/DL (ref 0.5–1.4)
EOSINOPHIL # BLD AUTO: 0.13 K/UL (ref 0–0.51)
EOSINOPHIL NFR BLD: 1.5 % (ref 0–6.9)
EPI CELLS #/AREA URNS HPF: NEGATIVE /HPF
ERYTHROCYTE [DISTWIDTH] IN BLOOD BY AUTOMATED COUNT: 42.5 FL (ref 35.9–50)
GLOBULIN SER CALC-MCNC: 3.7 G/DL (ref 1.9–3.5)
GLUCOSE SERPL-MCNC: 106 MG/DL (ref 65–99)
GLUCOSE UR STRIP.AUTO-MCNC: NEGATIVE MG/DL
HCT VFR BLD AUTO: 41.2 % (ref 37–47)
HGB BLD-MCNC: 13.4 G/DL (ref 12–16)
HYALINE CASTS #/AREA URNS LPF: ABNORMAL /LPF
IMM GRANULOCYTES # BLD AUTO: 0.02 K/UL (ref 0–0.11)
IMM GRANULOCYTES NFR BLD AUTO: 0.2 % (ref 0–0.9)
KETONES UR STRIP.AUTO-MCNC: NEGATIVE MG/DL
LEUKOCYTE ESTERASE UR QL STRIP.AUTO: NEGATIVE
LIPASE SERPL-CCNC: 16 U/L (ref 11–82)
LYMPHOCYTES # BLD AUTO: 3.07 K/UL (ref 1–4.8)
LYMPHOCYTES NFR BLD: 35.5 % (ref 22–41)
MCH RBC QN AUTO: 28.1 PG (ref 27–33)
MCHC RBC AUTO-ENTMCNC: 32.5 G/DL (ref 33.6–35)
MCV RBC AUTO: 86.4 FL (ref 81.4–97.8)
MICRO URNS: ABNORMAL
MONOCYTES # BLD AUTO: 0.57 K/UL (ref 0–0.85)
MONOCYTES NFR BLD AUTO: 6.6 % (ref 0–13.4)
NEUTROPHILS # BLD AUTO: 4.83 K/UL (ref 2–7.15)
NEUTROPHILS NFR BLD: 56 % (ref 44–72)
NITRITE UR QL STRIP.AUTO: POSITIVE
NRBC # BLD AUTO: 0 K/UL
NRBC BLD-RTO: 0 /100 WBC
PH UR STRIP.AUTO: 7 [PH] (ref 5–8)
PLATELET # BLD AUTO: 341 K/UL (ref 164–446)
PMV BLD AUTO: 10.2 FL (ref 9–12.9)
POTASSIUM SERPL-SCNC: 3.7 MMOL/L (ref 3.6–5.5)
PROT SERPL-MCNC: 8.3 G/DL (ref 6–8.2)
PROT UR QL STRIP: NEGATIVE MG/DL
RBC # BLD AUTO: 4.77 M/UL (ref 4.2–5.4)
RBC # URNS HPF: ABNORMAL /HPF
RBC UR QL AUTO: NEGATIVE
SODIUM SERPL-SCNC: 138 MMOL/L (ref 135–145)
SP GR UR STRIP.AUTO: 1.02
UROBILINOGEN UR STRIP.AUTO-MCNC: 0.2 MG/DL
WBC # BLD AUTO: 8.6 K/UL (ref 4.8–10.8)
WBC #/AREA URNS HPF: ABNORMAL /HPF

## 2021-02-16 PROCEDURE — 96374 THER/PROPH/DIAG INJ IV PUSH: CPT

## 2021-02-16 PROCEDURE — 36415 COLL VENOUS BLD VENIPUNCTURE: CPT

## 2021-02-16 PROCEDURE — 99284 EMERGENCY DEPT VISIT MOD MDM: CPT

## 2021-02-16 PROCEDURE — 80053 COMPREHEN METABOLIC PANEL: CPT

## 2021-02-16 PROCEDURE — 700117 HCHG RX CONTRAST REV CODE 255: Performed by: EMERGENCY MEDICINE

## 2021-02-16 PROCEDURE — 96375 TX/PRO/DX INJ NEW DRUG ADDON: CPT

## 2021-02-16 PROCEDURE — 74177 CT ABD & PELVIS W/CONTRAST: CPT

## 2021-02-16 PROCEDURE — 83690 ASSAY OF LIPASE: CPT

## 2021-02-16 PROCEDURE — 81001 URINALYSIS AUTO W/SCOPE: CPT

## 2021-02-16 PROCEDURE — 700111 HCHG RX REV CODE 636 W/ 250 OVERRIDE (IP): Performed by: EMERGENCY MEDICINE

## 2021-02-16 PROCEDURE — 85025 COMPLETE CBC W/AUTO DIFF WBC: CPT

## 2021-02-16 RX ORDER — ONDANSETRON 2 MG/ML
4 INJECTION INTRAMUSCULAR; INTRAVENOUS ONCE
Status: COMPLETED | OUTPATIENT
Start: 2021-02-16 | End: 2021-02-16

## 2021-02-16 RX ORDER — ONDANSETRON 4 MG/1
4 TABLET, ORALLY DISINTEGRATING ORAL EVERY 6 HOURS PRN
Qty: 10 TABLET | Refills: 0 | Status: SHIPPED | OUTPATIENT
Start: 2021-02-16 | End: 2021-06-18

## 2021-02-16 RX ADMIN — FENTANYL CITRATE 50 MCG: 50 INJECTION, SOLUTION INTRAMUSCULAR; INTRAVENOUS at 14:09

## 2021-02-16 RX ADMIN — IOHEXOL 100 ML: 350 INJECTION, SOLUTION INTRAVENOUS at 11:00

## 2021-02-16 RX ADMIN — ONDANSETRON 4 MG: 2 INJECTION INTRAMUSCULAR; INTRAVENOUS at 14:09

## 2021-02-16 NOTE — ED PROVIDER NOTES
ED Provider Note    CHIEF COMPLAINT  Chief Complaint   Patient presents with   • Rectal Bleeding     BRBPR x 5 days.  Pt reports bleeding enough to turn the toilet water red.    • Abdominal Pain     mid and R sided abdominal pain x 1 week   • N/V     last emesis 1 week ago.  Pt reports nausea frequently.        HPI  Kalina Collier is a 35 y.o. female who presents For evaluation of several days off, on nearly a week of crampy lower abdominal pain blood intermixed with some stool with mucus.  She denies possibility of pregnancy.  No associatedAntibiotic use in the last 2 months.  No dysuria or hematuria.  Pain comes and goes in waves primarily right lower quadrant no history ofEndometriosis.  The patient has had both her uterus and ovaries removed.  Pain is crampy worse with coughing and walking no fevers or chills    REVIEW OF SYSTEMS  See HPI for further details. No night sweats weight loss numbness tingling weakness rashAll other systems are negative.     PAST MEDICAL HISTORY  Past Medical History:   Diagnosis Date   • Back pain    • Hypertension        FAMILY HISTORY  Noncontributory    SOCIAL HISTORY  Social History     Socioeconomic History   • Marital status: Single     Spouse name: Not on file   • Number of children: Not on file   • Years of education: Not on file   • Highest education level: Not on file   Occupational History   • Not on file   Tobacco Use   • Smoking status: Never Smoker   • Smokeless tobacco: Never Used   Substance and Sexual Activity   • Alcohol use: No   • Drug use: No   • Sexual activity: Not on file   Other Topics Concern   • Not on file   Social History Narrative   • Not on file     Social Determinants of Health     Financial Resource Strain:    • Difficulty of Paying Living Expenses:    Food Insecurity:    • Worried About Running Out of Food in the Last Year:    • Ran Out of Food in the Last Year:    Transportation Needs:    • Lack of Transportation (Medical):    • Lack of  "Transportation (Non-Medical):    Physical Activity:    • Days of Exercise per Week:    • Minutes of Exercise per Session:    Stress:    • Feeling of Stress :    Social Connections:    • Frequency of Communication with Friends and Family:    • Frequency of Social Gatherings with Friends and Family:    • Attends [employer] Services:    • Active Member of Clubs or Organizations:    • Attends Club or Organization Meetings:    • Marital Status:    Intimate Partner Violence:    • Fear of Current or Ex-Partner:    • Emotionally Abused:    • Physically Abused:    • Sexually Abused:        SURGICAL HISTORY  Past Surgical History:   Procedure Laterality Date   • HARDWARE REMOVAL ORTHO Right 7/16/2019    Procedure: HARDWARE REMOVAL ORTHO- FOOT AND OTHER INDICATED PROCEDURES;  Surgeon: Yoandy Neville M.D.;  Location: SURGERY [facility];  Service: Orthopedics   • FOOT ORIF Right 12/31/2018    Procedure: FOOT ORIF;  Surgeon: Yoandy Neville M.D.;  Location: SURGERY [facility];  Service: Orthopedics   • GYN SURGERY      D&C approx 2003       CURRENT MEDICATIONS    Current Facility-Administered Medications:   •  iohexol (OMNIPAQUE) 350 mg/mL, 100 mL, Intravenous, Once, Everton Leiva M.D.  No current outpatient medications on file.      ALLERGIES  Allergies   Allergen Reactions   • Codeine Hives   • Darvocet [Propoxyphene N-Apap] Hives   • Penicillins Hives       PHYSICAL EXAM  VITAL SIGNS: /94   Pulse 99   Temp 36.5 °C (97.7 °F) (Temporal)   Resp 14   Ht 1.651 m (5' 5\")   Wt 98.4 kg (216 lb 14.9 oz)   LMP 07/09/2019   SpO2 98%   BMI 36.10 kg/m²       Constitutional: Patient appears uncomfortable  HENT: Normocephalic, Atraumatic, Bilateral external ears normal, Oropharynx moist, No oral exudates, Nose normal.   Eyes: PERRLA, EOMI, Conjunctiva normal, No discharge.   Cardiovascular: Slight tachycardia noted, Normal rhythm, No murmurs, No rubs, No gallops.   Thorax & Lungs: Normal breath sounds, No " respiratory distress, No wheezing, No chest tenderness.   Abdomen: Bowel sounds normal, Soft, Reproducible right mid and lower abdominal pain no rebound guarding or rigidity no hernias or masses palpated or visualized.   Skin: Warm, Dry, No erythema, No rash.   Back: No tenderness, No CVA tenderness.   Genitalia: Patient declined  Extremities: Intact distal pulses, No edema, No tenderness, No cyanosis, No clubbing.   Musculoskeletal: Good range of motion in all major joints. No tenderness to palpation or major deformities noted.   Neurologic: Alert & oriented x 3, Normal motor function, Normal sensory function, No focal deficits noted.   Psychiatric: Anxious    Results for orders placed or performed during the hospital encounter of 02/16/21   CBC WITH DIFFERENTIAL   Result Value Ref Range    WBC 8.6 4.8 - 10.8 K/uL    RBC 4.77 4.20 - 5.40 M/uL    Hemoglobin 13.4 12.0 - 16.0 g/dL    Hematocrit 41.2 37.0 - 47.0 %    MCV 86.4 81.4 - 97.8 fL    MCH 28.1 27.0 - 33.0 pg    MCHC 32.5 (L) 33.6 - 35.0 g/dL    RDW 42.5 35.9 - 50.0 fL    Platelet Count 341 164 - 446 K/uL    MPV 10.2 9.0 - 12.9 fL    Neutrophils-Polys 56.00 44.00 - 72.00 %    Lymphocytes 35.50 22.00 - 41.00 %    Monocytes 6.60 0.00 - 13.40 %    Eosinophils 1.50 0.00 - 6.90 %    Basophils 0.20 0.00 - 1.80 %    Immature Granulocytes 0.20 0.00 - 0.90 %    Nucleated RBC 0.00 /100 WBC    Neutrophils (Absolute) 4.83 2.00 - 7.15 K/uL    Lymphs (Absolute) 3.07 1.00 - 4.80 K/uL    Monos (Absolute) 0.57 0.00 - 0.85 K/uL    Eos (Absolute) 0.13 0.00 - 0.51 K/uL    Baso (Absolute) 0.02 0.00 - 0.12 K/uL    Immature Granulocytes (abs) 0.02 0.00 - 0.11 K/uL    NRBC (Absolute) 0.00 K/uL   COMP METABOLIC PANEL   Result Value Ref Range    Sodium 138 135 - 145 mmol/L    Potassium 3.7 3.6 - 5.5 mmol/L    Chloride 103 96 - 112 mmol/L    Co2 23 20 - 33 mmol/L    Anion Gap 12.0 7.0 - 16.0    Glucose 106 (H) 65 - 99 mg/dL    Bun 8 8 - 22 mg/dL    Creatinine 0.62 0.50 - 1.40 mg/dL     Calcium 9.9 8.5 - 10.5 mg/dL    AST(SGOT) 13 12 - 45 U/L    ALT(SGPT) 9 2 - 50 U/L    Alkaline Phosphatase 118 (H) 30 - 99 U/L    Total Bilirubin 0.2 0.1 - 1.5 mg/dL    Albumin 4.6 3.2 - 4.9 g/dL    Total Protein 8.3 (H) 6.0 - 8.2 g/dL    Globulin 3.7 (H) 1.9 - 3.5 g/dL    A-G Ratio 1.2 g/dL   LIPASE   Result Value Ref Range    Lipase 16 11 - 82 U/L   URINALYSIS    Specimen: Blood   Result Value Ref Range    Color Yellow     Character Hazy (A)     Specific Gravity 1.025 <1.035    Ph 7.0 5.0 - 8.0    Glucose Negative Negative mg/dL    Ketones Negative Negative mg/dL    Protein Negative Negative mg/dL    Bilirubin Negative Negative    Urobilinogen, Urine 0.2 Negative    Nitrite Positive (A) Negative    Leukocyte Esterase Negative Negative    Occult Blood Negative Negative    Micro Urine Req Microscopic    URINE MICROSCOPIC (W/UA)   Result Value Ref Range    WBC 2-5 /hpf    RBC 0-2 /hpf    Bacteria Moderate (A) None /hpf    Epithelial Cells Negative /hpf    Hyaline Cast 0-2 /lpf   ESTIMATED GFR   Result Value Ref Range    GFR If African American >60 >60 mL/min/1.73 m 2    GFR If Non African American >60 >60 mL/min/1.73 m 2     CT-ABDOMEN-PELVIS WITH   Final Result      1.  No evidence of bowel obstruction or focal inflammatory change.      2.  Fatty change of the liver.      3.  Small hepatic cyst or hemangioma.      4.  Prior cholecystectomy.        An IV is established.  Differential diagnosis included viral enteritis, diverticulitis colitis appendicitis.  The patient had extensive work-up.Laboratory studies including CBC metabolic panel and urinalysis are reassuring.  Specifically there is no profound leukocytosis or bandemia evidence of any metabolic acidosis and CT scan did not demonstrate any acute obstructive or inflammatory process and the appendix was specifically visualized and noted to be normal.Patient has not been on any antibiotics recently and has no history of inflammatory bowel disease.She likely has  some underlying viral enteritis but no indication for antibiotics or surgery or admission.  The patient will be discharged home on a clear liquid diet and antiemetics and recommend return in 12 to 24 hours if symptoms progress or worsen.  The patient has no active urinary symptoms therefore I would prefer to wait for urine culture before empirically starting antibiotics out of risk for worsening diarrheal illness.    COURSE & MEDICAL DECISION MAKING  Pertinent Labs & Imaging studies reviewed. (See chart for details)  As above    FINAL IMPRESSION  1. Abdominal pain  2. Nausea vomiting and diarrhea       Electronically signed by: Everton Leiva M.D., 2/16/2021 1:43 PM

## 2021-02-16 NOTE — ED TRIAGE NOTES
"Kalina Collier 35 y.o. female ambulatory to triage for     Chief Complaint   Patient presents with   • Rectal Bleeding     BRBPR x 5 days.  Pt reports bleeding enough to turn the toilet water red.    • Abdominal Pain     mid and R sided abdominal pain x 1 week   • N/V     last emesis 1 week ago.  Pt reports nausea frequently.      Pt in NAD.  Protocol orders placed.  VSS.  /94   Pulse 99   Temp 36.5 °C (97.7 °F) (Temporal)   Resp 14   Ht 1.651 m (5' 5\")   Wt 98.4 kg (216 lb 14.9 oz)   LMP 07/09/2019   SpO2 98%   BMI 36.10 kg/m²   Pt returned to the lobby to await bed assignment.  Advised to return to the triage desk for any changes/concerns.  "

## 2021-06-14 ENCOUNTER — HOSPITAL ENCOUNTER (OUTPATIENT)
Facility: MEDICAL CENTER | Age: 36
End: 2021-06-14
Attending: NEUROLOGICAL SURGERY | Admitting: NEUROLOGICAL SURGERY
Payer: COMMERCIAL

## 2021-06-18 ENCOUNTER — HOSPITAL ENCOUNTER (OUTPATIENT)
Dept: RADIOLOGY | Facility: MEDICAL CENTER | Age: 36
DRG: 455 | End: 2021-06-18
Attending: NEUROLOGICAL SURGERY | Admitting: NEUROLOGICAL SURGERY
Payer: COMMERCIAL

## 2021-06-18 ENCOUNTER — PRE-ADMISSION TESTING (OUTPATIENT)
Dept: ADMISSIONS | Facility: MEDICAL CENTER | Age: 36
DRG: 455 | End: 2021-06-18
Attending: NEUROLOGICAL SURGERY
Payer: COMMERCIAL

## 2021-06-18 DIAGNOSIS — R82.90 NONSPECIFIC FINDING ON EXAMINATION OF URINE: ICD-10-CM

## 2021-06-18 DIAGNOSIS — Z01.810 PRE-OPERATIVE CARDIOVASCULAR EXAMINATION: ICD-10-CM

## 2021-06-18 DIAGNOSIS — Z01.812 PRE-OPERATIVE LABORATORY EXAMINATION: ICD-10-CM

## 2021-06-18 DIAGNOSIS — R94.31 NONSPECIFIC ABNORMAL ELECTROCARDIOGRAM (ECG) (EKG): ICD-10-CM

## 2021-06-18 DIAGNOSIS — M51.36 DEGENERATION OF LUMBAR INTERVERTEBRAL DISC: ICD-10-CM

## 2021-06-18 DIAGNOSIS — M43.06 SPONDYLOLYSIS, LUMBAR REGION: ICD-10-CM

## 2021-06-18 DIAGNOSIS — M48.061 SPINAL STENOSIS OF LUMBAR REGION, UNSPECIFIED WHETHER NEUROGENIC CLAUDICATION PRESENT: ICD-10-CM

## 2021-06-18 DIAGNOSIS — R79.1 ABNORMAL COAGULATION PROFILE: ICD-10-CM

## 2021-06-18 DIAGNOSIS — Z01.811 PRE-OPERATIVE RESPIRATORY EXAMINATION: ICD-10-CM

## 2021-06-18 LAB
AMORPH CRY #/AREA URNS HPF: PRESENT /HPF
ANION GAP SERPL CALC-SCNC: 10 MMOL/L (ref 7–16)
APPEARANCE UR: ABNORMAL
APTT PPP: 34.6 SEC (ref 24.7–36)
BACTERIA #/AREA URNS HPF: ABNORMAL /HPF
BILIRUB UR QL STRIP.AUTO: NEGATIVE
BUN SERPL-MCNC: 8 MG/DL (ref 8–22)
CALCIUM SERPL-MCNC: 9.6 MG/DL (ref 8.5–10.5)
CHLORIDE SERPL-SCNC: 105 MMOL/L (ref 96–112)
CO2 SERPL-SCNC: 28 MMOL/L (ref 20–33)
COLOR UR: YELLOW
CREAT SERPL-MCNC: 0.75 MG/DL (ref 0.5–1.4)
EKG IMPRESSION: NORMAL
EPI CELLS #/AREA URNS HPF: ABNORMAL /HPF
ERYTHROCYTE [DISTWIDTH] IN BLOOD BY AUTOMATED COUNT: 45.7 FL (ref 35.9–50)
GLUCOSE SERPL-MCNC: 91 MG/DL (ref 65–99)
GLUCOSE UR STRIP.AUTO-MCNC: NEGATIVE MG/DL
GRAN CASTS #/AREA URNS LPF: ABNORMAL /LPF
HCT VFR BLD AUTO: 41.7 % (ref 37–47)
HGB BLD-MCNC: 12.6 G/DL (ref 12–16)
HYALINE CASTS #/AREA URNS LPF: ABNORMAL /LPF
INR PPP: 1.02 (ref 0.87–1.13)
KETONES UR STRIP.AUTO-MCNC: NEGATIVE MG/DL
LEUKOCYTE ESTERASE UR QL STRIP.AUTO: ABNORMAL
MCH RBC QN AUTO: 27.6 PG (ref 27–33)
MCHC RBC AUTO-ENTMCNC: 30.2 G/DL (ref 33.6–35)
MCV RBC AUTO: 91.2 FL (ref 81.4–97.8)
MICRO URNS: ABNORMAL
NITRITE UR QL STRIP.AUTO: NEGATIVE
PH UR STRIP.AUTO: 8 [PH] (ref 5–8)
PLATELET # BLD AUTO: 212 K/UL (ref 164–446)
PMV BLD AUTO: 12 FL (ref 9–12.9)
POTASSIUM SERPL-SCNC: 4 MMOL/L (ref 3.6–5.5)
PROT UR QL STRIP: NEGATIVE MG/DL
PROTHROMBIN TIME: 13.1 SEC (ref 12–14.6)
RBC # BLD AUTO: 4.57 M/UL (ref 4.2–5.4)
RBC # URNS HPF: ABNORMAL /HPF
RBC UR QL AUTO: NEGATIVE
SODIUM SERPL-SCNC: 143 MMOL/L (ref 135–145)
SP GR UR STRIP.AUTO: 1.02
UROBILINOGEN UR STRIP.AUTO-MCNC: 0.2 MG/DL
WBC # BLD AUTO: 8.6 K/UL (ref 4.8–10.8)
WBC #/AREA URNS HPF: ABNORMAL /HPF

## 2021-06-18 PROCEDURE — U0003 INFECTIOUS AGENT DETECTION BY NUCLEIC ACID (DNA OR RNA); SEVERE ACUTE RESPIRATORY SYNDROME CORONAVIRUS 2 (SARS-COV-2) (CORONAVIRUS DISEASE [COVID-19]), AMPLIFIED PROBE TECHNIQUE, MAKING USE OF HIGH THROUGHPUT TECHNOLOGIES AS DESCRIBED BY CMS-2020-01-R: HCPCS

## 2021-06-18 PROCEDURE — 80048 BASIC METABOLIC PNL TOTAL CA: CPT

## 2021-06-18 PROCEDURE — 81001 URINALYSIS AUTO W/SCOPE: CPT

## 2021-06-18 PROCEDURE — 72110 X-RAY EXAM L-2 SPINE 4/>VWS: CPT

## 2021-06-18 PROCEDURE — 93010 ELECTROCARDIOGRAM REPORT: CPT | Performed by: INTERNAL MEDICINE

## 2021-06-18 PROCEDURE — 36415 COLL VENOUS BLD VENIPUNCTURE: CPT

## 2021-06-18 PROCEDURE — 85730 THROMBOPLASTIN TIME PARTIAL: CPT

## 2021-06-18 PROCEDURE — 71046 X-RAY EXAM CHEST 2 VIEWS: CPT

## 2021-06-18 PROCEDURE — 85027 COMPLETE CBC AUTOMATED: CPT

## 2021-06-18 PROCEDURE — 85610 PROTHROMBIN TIME: CPT

## 2021-06-18 PROCEDURE — U0005 INFEC AGEN DETEC AMPLI PROBE: HCPCS

## 2021-06-18 PROCEDURE — C9803 HOPD COVID-19 SPEC COLLECT: HCPCS

## 2021-06-18 PROCEDURE — 93005 ELECTROCARDIOGRAM TRACING: CPT

## 2021-06-18 ASSESSMENT — FIBROSIS 4 INDEX: FIB4 SCORE: 0.47

## 2021-06-19 LAB
SARS-COV-2 RNA RESP QL NAA+PROBE: NOTDETECTED
SPECIMEN SOURCE: NORMAL

## 2021-06-22 ENCOUNTER — APPOINTMENT (OUTPATIENT)
Dept: RADIOLOGY | Facility: MEDICAL CENTER | Age: 36
DRG: 455 | End: 2021-06-22
Attending: NEUROLOGICAL SURGERY
Payer: COMMERCIAL

## 2021-06-22 ENCOUNTER — ANESTHESIA (OUTPATIENT)
Dept: SURGERY | Facility: MEDICAL CENTER | Age: 36
DRG: 455 | End: 2021-06-22
Payer: COMMERCIAL

## 2021-06-22 ENCOUNTER — HOSPITAL ENCOUNTER (INPATIENT)
Facility: MEDICAL CENTER | Age: 36
LOS: 3 days | DRG: 455 | End: 2021-06-25
Attending: NEUROLOGICAL SURGERY | Admitting: NEUROLOGICAL SURGERY
Payer: COMMERCIAL

## 2021-06-22 ENCOUNTER — ANESTHESIA EVENT (OUTPATIENT)
Dept: SURGERY | Facility: MEDICAL CENTER | Age: 36
DRG: 455 | End: 2021-06-22
Payer: COMMERCIAL

## 2021-06-22 DIAGNOSIS — M54.50 BILATERAL LOW BACK PAIN WITHOUT SCIATICA, UNSPECIFIED CHRONICITY: ICD-10-CM

## 2021-06-22 DIAGNOSIS — G89.18 PAIN, ACUTE POSTOPERATIVE: ICD-10-CM

## 2021-06-22 PROBLEM — I10 HYPERTENSION: Status: ACTIVE | Noted: 2021-06-22

## 2021-06-22 PROBLEM — R11.2 PONV (POSTOPERATIVE NAUSEA AND VOMITING): Status: ACTIVE | Noted: 2021-06-22

## 2021-06-22 PROBLEM — E11.9 DIABETES (HCC): Status: ACTIVE | Noted: 2021-06-22

## 2021-06-22 PROBLEM — Z98.890 PONV (POSTOPERATIVE NAUSEA AND VOMITING): Status: ACTIVE | Noted: 2021-06-22

## 2021-06-22 PROCEDURE — 0SG30A0 FUSION OF LUMBOSACRAL JOINT WITH INTERBODY FUSION DEVICE, ANTERIOR APPROACH, ANTERIOR COLUMN, OPEN APPROACH: ICD-10-PCS | Performed by: NEUROLOGICAL SURGERY

## 2021-06-22 PROCEDURE — 501838 HCHG SUTURE GENERAL: Performed by: NEUROLOGICAL SURGERY

## 2021-06-22 PROCEDURE — 95925 SOMATOSENSORY TESTING: CPT | Performed by: NEUROLOGICAL SURGERY

## 2021-06-22 PROCEDURE — C1713 ANCHOR/SCREW BN/BN,TIS/BN: HCPCS | Performed by: NEUROLOGICAL SURGERY

## 2021-06-22 PROCEDURE — 700101 HCHG RX REV CODE 250: Performed by: ANESTHESIOLOGY

## 2021-06-22 PROCEDURE — 160035 HCHG PACU - 1ST 60 MINS PHASE I: Performed by: NEUROLOGICAL SURGERY

## 2021-06-22 PROCEDURE — 700111 HCHG RX REV CODE 636 W/ 250 OVERRIDE (IP): Performed by: ANESTHESIOLOGY

## 2021-06-22 PROCEDURE — A9270 NON-COVERED ITEM OR SERVICE: HCPCS | Performed by: PHYSICIAN ASSISTANT

## 2021-06-22 PROCEDURE — 700101 HCHG RX REV CODE 250: Performed by: PHYSICIAN ASSISTANT

## 2021-06-22 PROCEDURE — 160048 HCHG OR STATISTICAL LEVEL 1-5: Performed by: NEUROLOGICAL SURGERY

## 2021-06-22 PROCEDURE — 95861 NEEDLE EMG 2 EXTREMITIES: CPT | Performed by: NEUROLOGICAL SURGERY

## 2021-06-22 PROCEDURE — 502000 HCHG MISC OR IMPLANTS RC 0278: Performed by: NEUROLOGICAL SURGERY

## 2021-06-22 PROCEDURE — 700102 HCHG RX REV CODE 250 W/ 637 OVERRIDE(OP): Performed by: ANESTHESIOLOGY

## 2021-06-22 PROCEDURE — 95940 IONM IN OPERATNG ROOM 15 MIN: CPT | Performed by: NEUROLOGICAL SURGERY

## 2021-06-22 PROCEDURE — 500885 HCHG PACK, JACKSON TABLE: Performed by: NEUROLOGICAL SURGERY

## 2021-06-22 PROCEDURE — 160031 HCHG SURGERY MINUTES - 1ST 30 MINS LEVEL 5: Performed by: NEUROLOGICAL SURGERY

## 2021-06-22 PROCEDURE — 160036 HCHG PACU - EA ADDL 30 MINS PHASE I: Performed by: NEUROLOGICAL SURGERY

## 2021-06-22 PROCEDURE — 700105 HCHG RX REV CODE 258: Performed by: NEUROLOGICAL SURGERY

## 2021-06-22 PROCEDURE — 700102 HCHG RX REV CODE 250 W/ 637 OVERRIDE(OP): Performed by: PHYSICIAN ASSISTANT

## 2021-06-22 PROCEDURE — 160042 HCHG SURGERY MINUTES - EA ADDL 1 MIN LEVEL 5: Performed by: NEUROLOGICAL SURGERY

## 2021-06-22 PROCEDURE — 160002 HCHG RECOVERY MINUTES (STAT): Performed by: NEUROLOGICAL SURGERY

## 2021-06-22 PROCEDURE — 160009 HCHG ANES TIME/MIN: Performed by: NEUROLOGICAL SURGERY

## 2021-06-22 PROCEDURE — C1821 INTERSPINOUS IMPLANT: HCPCS | Performed by: NEUROLOGICAL SURGERY

## 2021-06-22 PROCEDURE — 770006 HCHG ROOM/CARE - MED/SURG/GYN SEMI*

## 2021-06-22 PROCEDURE — 110454 HCHG SHELL REV 250: Performed by: NEUROLOGICAL SURGERY

## 2021-06-22 PROCEDURE — A9270 NON-COVERED ITEM OR SERVICE: HCPCS | Performed by: ANESTHESIOLOGY

## 2021-06-22 PROCEDURE — 700111 HCHG RX REV CODE 636 W/ 250 OVERRIDE (IP): Performed by: NEUROLOGICAL SURGERY

## 2021-06-22 PROCEDURE — A9270 NON-COVERED ITEM OR SERVICE: HCPCS | Performed by: NEUROLOGICAL SURGERY

## 2021-06-22 PROCEDURE — 700101 HCHG RX REV CODE 250: Performed by: NEUROLOGICAL SURGERY

## 2021-06-22 PROCEDURE — 74018 RADEX ABDOMEN 1 VIEW: CPT

## 2021-06-22 PROCEDURE — 72100 X-RAY EXAM L-S SPINE 2/3 VWS: CPT

## 2021-06-22 PROCEDURE — 95937 NEUROMUSCULAR JUNCTION TEST: CPT | Performed by: NEUROLOGICAL SURGERY

## 2021-06-22 PROCEDURE — 700111 HCHG RX REV CODE 636 W/ 250 OVERRIDE (IP): Performed by: PHYSICIAN ASSISTANT

## 2021-06-22 DEVICE — PUTTY 10CC NANOBONE (1/EA): Type: IMPLANTABLE DEVICE | Site: SPINE LUMBAR | Status: FUNCTIONAL

## 2021-06-22 DEVICE — IMPLANTABLE DEVICE: Type: IMPLANTABLE DEVICE | Site: SPINE LUMBAR | Status: FUNCTIONAL

## 2021-06-22 RX ORDER — LIDOCAINE HYDROCHLORIDE 20 MG/ML
INJECTION, SOLUTION EPIDURAL; INFILTRATION; INTRACAUDAL; PERINEURAL PRN
Status: DISCONTINUED | OUTPATIENT
Start: 2021-06-22 | End: 2021-06-22 | Stop reason: SURG

## 2021-06-22 RX ORDER — HYDROMORPHONE HYDROCHLORIDE 1 MG/ML
0.2 INJECTION, SOLUTION INTRAMUSCULAR; INTRAVENOUS; SUBCUTANEOUS
Status: DISCONTINUED | OUTPATIENT
Start: 2021-06-22 | End: 2021-06-22 | Stop reason: HOSPADM

## 2021-06-22 RX ORDER — DEXAMETHASONE SODIUM PHOSPHATE 4 MG/ML
INJECTION, SOLUTION INTRA-ARTICULAR; INTRALESIONAL; INTRAMUSCULAR; INTRAVENOUS; SOFT TISSUE PRN
Status: DISCONTINUED | OUTPATIENT
Start: 2021-06-22 | End: 2021-06-22 | Stop reason: SURG

## 2021-06-22 RX ORDER — AMOXICILLIN 250 MG
1 CAPSULE ORAL NIGHTLY
Status: DISCONTINUED | OUTPATIENT
Start: 2021-06-22 | End: 2021-06-24

## 2021-06-22 RX ORDER — CEFAZOLIN SODIUM 2 G/100ML
2 INJECTION, SOLUTION INTRAVENOUS EVERY 8 HOURS
Status: COMPLETED | OUTPATIENT
Start: 2021-06-22 | End: 2021-06-23

## 2021-06-22 RX ORDER — ROCURONIUM BROMIDE 10 MG/ML
INJECTION, SOLUTION INTRAVENOUS PRN
Status: DISCONTINUED | OUTPATIENT
Start: 2021-06-22 | End: 2021-06-22 | Stop reason: SURG

## 2021-06-22 RX ORDER — GABAPENTIN 300 MG/1
300 CAPSULE ORAL ONCE
Status: COMPLETED | OUTPATIENT
Start: 2021-06-22 | End: 2021-06-22

## 2021-06-22 RX ORDER — CIPROFLOXACIN 500 MG/1
500 TABLET, FILM COATED ORAL EVERY 12 HOURS
Status: COMPLETED | OUTPATIENT
Start: 2021-06-22 | End: 2021-06-24

## 2021-06-22 RX ORDER — ONDANSETRON 2 MG/ML
4 INJECTION INTRAMUSCULAR; INTRAVENOUS EVERY 4 HOURS PRN
Status: DISCONTINUED | OUTPATIENT
Start: 2021-06-22 | End: 2021-06-25 | Stop reason: HOSPADM

## 2021-06-22 RX ORDER — CEFAZOLIN SODIUM 1 G/3ML
INJECTION, POWDER, FOR SOLUTION INTRAMUSCULAR; INTRAVENOUS PRN
Status: DISCONTINUED | OUTPATIENT
Start: 2021-06-22 | End: 2021-06-22 | Stop reason: SURG

## 2021-06-22 RX ORDER — METHOCARBAMOL 750 MG/1
750 TABLET, FILM COATED ORAL EVERY 8 HOURS PRN
Status: DISCONTINUED | OUTPATIENT
Start: 2021-06-22 | End: 2021-06-25 | Stop reason: HOSPADM

## 2021-06-22 RX ORDER — MIDAZOLAM HYDROCHLORIDE 1 MG/ML
1 INJECTION INTRAMUSCULAR; INTRAVENOUS
Status: DISCONTINUED | OUTPATIENT
Start: 2021-06-22 | End: 2021-06-22 | Stop reason: HOSPADM

## 2021-06-22 RX ORDER — DIPHENHYDRAMINE HCL 25 MG
25 TABLET ORAL EVERY 6 HOURS PRN
Status: DISCONTINUED | OUTPATIENT
Start: 2021-06-22 | End: 2021-06-25 | Stop reason: HOSPADM

## 2021-06-22 RX ORDER — DIPHENHYDRAMINE HYDROCHLORIDE 50 MG/ML
12.5 INJECTION INTRAMUSCULAR; INTRAVENOUS
Status: DISCONTINUED | OUTPATIENT
Start: 2021-06-22 | End: 2021-06-22 | Stop reason: HOSPADM

## 2021-06-22 RX ORDER — FAMOTIDINE 20 MG/1
20 TABLET, FILM COATED ORAL 2 TIMES DAILY
Status: DISCONTINUED | OUTPATIENT
Start: 2021-06-22 | End: 2021-06-25 | Stop reason: HOSPADM

## 2021-06-22 RX ORDER — OXYCODONE HCL 10 MG/1
10 TABLET, FILM COATED, EXTENDED RELEASE ORAL ONCE
Status: COMPLETED | OUTPATIENT
Start: 2021-06-22 | End: 2021-06-22

## 2021-06-22 RX ORDER — OXYCODONE HCL 5 MG/5 ML
10 SOLUTION, ORAL ORAL
Status: COMPLETED | OUTPATIENT
Start: 2021-06-22 | End: 2021-06-22

## 2021-06-22 RX ORDER — PROMETHAZINE HYDROCHLORIDE 25 MG/1
12.5-25 SUPPOSITORY RECTAL EVERY 4 HOURS PRN
Status: DISCONTINUED | OUTPATIENT
Start: 2021-06-22 | End: 2021-06-25 | Stop reason: HOSPADM

## 2021-06-22 RX ORDER — MIDAZOLAM HYDROCHLORIDE 1 MG/ML
INJECTION INTRAMUSCULAR; INTRAVENOUS PRN
Status: DISCONTINUED | OUTPATIENT
Start: 2021-06-22 | End: 2021-06-22 | Stop reason: SURG

## 2021-06-22 RX ORDER — OXYCODONE HYDROCHLORIDE 5 MG/1
5 TABLET ORAL EVERY 4 HOURS PRN
Status: DISCONTINUED | OUTPATIENT
Start: 2021-06-22 | End: 2021-06-24

## 2021-06-22 RX ORDER — ONDANSETRON 2 MG/ML
4 INJECTION INTRAMUSCULAR; INTRAVENOUS
Status: DISCONTINUED | OUTPATIENT
Start: 2021-06-22 | End: 2021-06-22 | Stop reason: HOSPADM

## 2021-06-22 RX ORDER — DIPHENHYDRAMINE HYDROCHLORIDE 50 MG/ML
25 INJECTION INTRAMUSCULAR; INTRAVENOUS EVERY 6 HOURS PRN
Status: DISCONTINUED | OUTPATIENT
Start: 2021-06-22 | End: 2021-06-25 | Stop reason: HOSPADM

## 2021-06-22 RX ORDER — DEXTROSE MONOHYDRATE, SODIUM CHLORIDE, AND POTASSIUM CHLORIDE 50; 1.49; 4.5 G/1000ML; G/1000ML; G/1000ML
INJECTION, SOLUTION INTRAVENOUS CONTINUOUS
Status: DISCONTINUED | OUTPATIENT
Start: 2021-06-22 | End: 2021-06-25 | Stop reason: HOSPADM

## 2021-06-22 RX ORDER — ENEMA 19; 7 G/133ML; G/133ML
1 ENEMA RECTAL
Status: DISCONTINUED | OUTPATIENT
Start: 2021-06-22 | End: 2021-06-24

## 2021-06-22 RX ORDER — DOCUSATE SODIUM 100 MG/1
100 CAPSULE, LIQUID FILLED ORAL 2 TIMES DAILY
Status: DISCONTINUED | OUTPATIENT
Start: 2021-06-22 | End: 2021-06-24

## 2021-06-22 RX ORDER — HYDRALAZINE HYDROCHLORIDE 20 MG/ML
5 INJECTION INTRAMUSCULAR; INTRAVENOUS
Status: DISCONTINUED | OUTPATIENT
Start: 2021-06-22 | End: 2021-06-22 | Stop reason: HOSPADM

## 2021-06-22 RX ORDER — PROCHLORPERAZINE EDISYLATE 5 MG/ML
5-10 INJECTION INTRAMUSCULAR; INTRAVENOUS EVERY 4 HOURS PRN
Status: DISCONTINUED | OUTPATIENT
Start: 2021-06-22 | End: 2021-06-25 | Stop reason: HOSPADM

## 2021-06-22 RX ORDER — AMOXICILLIN 250 MG
1 CAPSULE ORAL
Status: DISCONTINUED | OUTPATIENT
Start: 2021-06-22 | End: 2021-06-24

## 2021-06-22 RX ORDER — ONDANSETRON 4 MG/1
4 TABLET, ORALLY DISINTEGRATING ORAL EVERY 4 HOURS PRN
Status: DISCONTINUED | OUTPATIENT
Start: 2021-06-22 | End: 2021-06-25 | Stop reason: HOSPADM

## 2021-06-22 RX ORDER — LABETALOL HYDROCHLORIDE 5 MG/ML
10 INJECTION, SOLUTION INTRAVENOUS
Status: DISCONTINUED | OUTPATIENT
Start: 2021-06-22 | End: 2021-06-25 | Stop reason: HOSPADM

## 2021-06-22 RX ORDER — SODIUM CHLORIDE, SODIUM LACTATE, POTASSIUM CHLORIDE, CALCIUM CHLORIDE 600; 310; 30; 20 MG/100ML; MG/100ML; MG/100ML; MG/100ML
INJECTION, SOLUTION INTRAVENOUS CONTINUOUS
Status: DISCONTINUED | OUTPATIENT
Start: 2021-06-22 | End: 2021-06-22 | Stop reason: HOSPADM

## 2021-06-22 RX ORDER — ONDANSETRON 2 MG/ML
INJECTION INTRAMUSCULAR; INTRAVENOUS PRN
Status: DISCONTINUED | OUTPATIENT
Start: 2021-06-22 | End: 2021-06-22 | Stop reason: SURG

## 2021-06-22 RX ORDER — ACETAMINOPHEN 500 MG
1000 TABLET ORAL ONCE
Status: COMPLETED | OUTPATIENT
Start: 2021-06-22 | End: 2021-06-22

## 2021-06-22 RX ORDER — HYDROMORPHONE HYDROCHLORIDE 1 MG/ML
0.5 INJECTION, SOLUTION INTRAMUSCULAR; INTRAVENOUS; SUBCUTANEOUS
Status: DISCONTINUED | OUTPATIENT
Start: 2021-06-22 | End: 2021-06-24

## 2021-06-22 RX ORDER — POLYETHYLENE GLYCOL 3350 17 G/17G
1 POWDER, FOR SOLUTION ORAL DAILY
Status: DISCONTINUED | OUTPATIENT
Start: 2021-06-23 | End: 2021-06-25 | Stop reason: HOSPADM

## 2021-06-22 RX ORDER — OXYCODONE HCL 5 MG/5 ML
5 SOLUTION, ORAL ORAL
Status: COMPLETED | OUTPATIENT
Start: 2021-06-22 | End: 2021-06-22

## 2021-06-22 RX ORDER — CALCIUM CARBONATE 500 MG/1
500 TABLET, CHEWABLE ORAL 2 TIMES DAILY
Status: DISCONTINUED | OUTPATIENT
Start: 2021-06-22 | End: 2021-06-25 | Stop reason: HOSPADM

## 2021-06-22 RX ORDER — HYDROCODONE BITARTRATE AND ACETAMINOPHEN 5; 325 MG/1; MG/1
1 TABLET ORAL EVERY 4 HOURS PRN
Status: DISCONTINUED | OUTPATIENT
Start: 2021-06-22 | End: 2021-06-25 | Stop reason: HOSPADM

## 2021-06-22 RX ORDER — LABETALOL HYDROCHLORIDE 5 MG/ML
5 INJECTION, SOLUTION INTRAVENOUS
Status: DISCONTINUED | OUTPATIENT
Start: 2021-06-22 | End: 2021-06-22 | Stop reason: HOSPADM

## 2021-06-22 RX ORDER — ACETAMINOPHEN 500 MG
500 TABLET ORAL EVERY 6 HOURS PRN
Status: DISCONTINUED | OUTPATIENT
Start: 2021-06-22 | End: 2021-06-25 | Stop reason: HOSPADM

## 2021-06-22 RX ORDER — MEPERIDINE HYDROCHLORIDE 25 MG/ML
12.5 INJECTION INTRAMUSCULAR; INTRAVENOUS; SUBCUTANEOUS
Status: DISCONTINUED | OUTPATIENT
Start: 2021-06-22 | End: 2021-06-22 | Stop reason: HOSPADM

## 2021-06-22 RX ORDER — ALPRAZOLAM 0.25 MG/1
0.25 TABLET ORAL 2 TIMES DAILY PRN
Status: DISCONTINUED | OUTPATIENT
Start: 2021-06-22 | End: 2021-06-25 | Stop reason: HOSPADM

## 2021-06-22 RX ORDER — HYDROMORPHONE HYDROCHLORIDE 1 MG/ML
0.1 INJECTION, SOLUTION INTRAMUSCULAR; INTRAVENOUS; SUBCUTANEOUS
Status: DISCONTINUED | OUTPATIENT
Start: 2021-06-22 | End: 2021-06-22 | Stop reason: HOSPADM

## 2021-06-22 RX ORDER — DEXTROSE MONOHYDRATE 25 G/50ML
50 INJECTION, SOLUTION INTRAVENOUS
Status: DISCONTINUED | OUTPATIENT
Start: 2021-06-22 | End: 2021-06-22 | Stop reason: HOSPADM

## 2021-06-22 RX ORDER — HYDROMORPHONE HYDROCHLORIDE 1 MG/ML
0.4 INJECTION, SOLUTION INTRAMUSCULAR; INTRAVENOUS; SUBCUTANEOUS
Status: DISCONTINUED | OUTPATIENT
Start: 2021-06-22 | End: 2021-06-22 | Stop reason: HOSPADM

## 2021-06-22 RX ORDER — BISACODYL 10 MG
10 SUPPOSITORY, RECTAL RECTAL
Status: DISCONTINUED | OUTPATIENT
Start: 2021-06-22 | End: 2021-06-24

## 2021-06-22 RX ORDER — METOPROLOL TARTRATE 1 MG/ML
1 INJECTION, SOLUTION INTRAVENOUS
Status: DISCONTINUED | OUTPATIENT
Start: 2021-06-22 | End: 2021-06-22 | Stop reason: HOSPADM

## 2021-06-22 RX ORDER — PROMETHAZINE HYDROCHLORIDE 25 MG/1
12.5-25 TABLET ORAL EVERY 4 HOURS PRN
Status: DISCONTINUED | OUTPATIENT
Start: 2021-06-22 | End: 2021-06-25 | Stop reason: HOSPADM

## 2021-06-22 RX ORDER — HALOPERIDOL 5 MG/ML
1 INJECTION INTRAMUSCULAR
Status: DISCONTINUED | OUTPATIENT
Start: 2021-06-22 | End: 2021-06-22 | Stop reason: HOSPADM

## 2021-06-22 RX ORDER — CEFAZOLIN SODIUM 1 G/3ML
INJECTION, POWDER, FOR SOLUTION INTRAMUSCULAR; INTRAVENOUS
Status: DISCONTINUED | OUTPATIENT
Start: 2021-06-22 | End: 2021-06-22 | Stop reason: HOSPADM

## 2021-06-22 RX ORDER — SCOLOPAMINE TRANSDERMAL SYSTEM 1 MG/1
1 PATCH, EXTENDED RELEASE TRANSDERMAL ONCE
Status: COMPLETED | OUTPATIENT
Start: 2021-06-22 | End: 2021-06-25

## 2021-06-22 RX ORDER — CIPROFLOXACIN 500 MG/1
500 TABLET, FILM COATED ORAL 2 TIMES DAILY
Status: ON HOLD | COMMUNITY
End: 2021-06-25

## 2021-06-22 RX ORDER — SODIUM CHLORIDE, SODIUM LACTATE, POTASSIUM CHLORIDE, CALCIUM CHLORIDE 600; 310; 30; 20 MG/100ML; MG/100ML; MG/100ML; MG/100ML
INJECTION, SOLUTION INTRAVENOUS CONTINUOUS
Status: ACTIVE | OUTPATIENT
Start: 2021-06-22 | End: 2021-06-22

## 2021-06-22 RX ADMIN — HYDROCODONE BITARTRATE AND ACETAMINOPHEN 1 TABLET: 5; 325 TABLET ORAL at 13:06

## 2021-06-22 RX ADMIN — ACETAMINOPHEN 1000 MG: 500 TABLET ORAL at 06:52

## 2021-06-22 RX ADMIN — FENTANYL CITRATE 150 MCG: 50 INJECTION, SOLUTION INTRAMUSCULAR; INTRAVENOUS at 07:07

## 2021-06-22 RX ADMIN — LIDOCAINE HYDROCHLORIDE 0.5 ML: 10 INJECTION, SOLUTION EPIDURAL; INFILTRATION; INTRACAUDAL at 06:02

## 2021-06-22 RX ADMIN — POTASSIUM CHLORIDE, DEXTROSE MONOHYDRATE AND SODIUM CHLORIDE: 150; 5; 450 INJECTION, SOLUTION INTRAVENOUS at 13:05

## 2021-06-22 RX ADMIN — HYDROMORPHONE HYDROCHLORIDE 0.4 MG: 1 INJECTION, SOLUTION INTRAMUSCULAR; INTRAVENOUS; SUBCUTANEOUS at 08:55

## 2021-06-22 RX ADMIN — HYDROMORPHONE HYDROCHLORIDE 0.5 MG: 1 INJECTION, SOLUTION INTRAMUSCULAR; INTRAVENOUS; SUBCUTANEOUS at 22:11

## 2021-06-22 RX ADMIN — LIDOCAINE HYDROCHLORIDE 100 MG: 20 INJECTION, SOLUTION EPIDURAL; INFILTRATION; INTRACAUDAL at 07:07

## 2021-06-22 RX ADMIN — FAMOTIDINE 20 MG: 20 TABLET ORAL at 13:05

## 2021-06-22 RX ADMIN — OXYCODONE HYDROCHLORIDE 10 MG: 5 SOLUTION ORAL at 08:36

## 2021-06-22 RX ADMIN — DEXAMETHASONE SODIUM PHOSPHATE 4 MG: 4 INJECTION, SOLUTION INTRA-ARTICULAR; INTRALESIONAL; INTRAMUSCULAR; INTRAVENOUS; SOFT TISSUE at 07:07

## 2021-06-22 RX ADMIN — ANTACID TABLETS 500 MG: 500 TABLET, CHEWABLE ORAL at 17:13

## 2021-06-22 RX ADMIN — MIDAZOLAM HYDROCHLORIDE 2 MG: 1 INJECTION, SOLUTION INTRAMUSCULAR; INTRAVENOUS at 07:00

## 2021-06-22 RX ADMIN — DOCUSATE SODIUM 50 MG AND SENNOSIDES 8.6 MG 1 TABLET: 8.6; 5 TABLET, FILM COATED ORAL at 22:11

## 2021-06-22 RX ADMIN — HYDROMORPHONE HYDROCHLORIDE 0.2 MG: 1 INJECTION, SOLUTION INTRAMUSCULAR; INTRAVENOUS; SUBCUTANEOUS at 09:05

## 2021-06-22 RX ADMIN — CEFAZOLIN SODIUM 2 G: 2 INJECTION, SOLUTION INTRAVENOUS at 16:37

## 2021-06-22 RX ADMIN — ONDANSETRON 4 MG: 2 INJECTION INTRAMUSCULAR; INTRAVENOUS at 08:03

## 2021-06-22 RX ADMIN — CEFAZOLIN 2 G: 330 INJECTION, POWDER, FOR SOLUTION INTRAMUSCULAR; INTRAVENOUS at 07:07

## 2021-06-22 RX ADMIN — ROCURONIUM BROMIDE 50 MG: 10 INJECTION, SOLUTION INTRAVENOUS at 07:07

## 2021-06-22 RX ADMIN — POVIDONE IODINE 15 ML: 100 SOLUTION TOPICAL at 05:56

## 2021-06-22 RX ADMIN — GABAPENTIN 300 MG: 300 CAPSULE ORAL at 06:52

## 2021-06-22 RX ADMIN — METHOCARBAMOL 750 MG: 750 TABLET ORAL at 16:37

## 2021-06-22 RX ADMIN — OXYCODONE HYDROCHLORIDE 10 MG: 10 TABLET, FILM COATED, EXTENDED RELEASE ORAL at 06:52

## 2021-06-22 RX ADMIN — CEFAZOLIN SODIUM 2 G: 2 INJECTION, SOLUTION INTRAVENOUS at 22:11

## 2021-06-22 RX ADMIN — DOCUSATE SODIUM 100 MG: 100 CAPSULE, LIQUID FILLED ORAL at 17:13

## 2021-06-22 RX ADMIN — HYDROMORPHONE HYDROCHLORIDE 0.4 MG: 1 INJECTION, SOLUTION INTRAMUSCULAR; INTRAVENOUS; SUBCUTANEOUS at 08:45

## 2021-06-22 RX ADMIN — SCOPALAMINE 1 PATCH: 1 PATCH, EXTENDED RELEASE TRANSDERMAL at 06:52

## 2021-06-22 RX ADMIN — PROPOFOL 160 MG: 10 INJECTION, EMULSION INTRAVENOUS at 07:07

## 2021-06-22 RX ADMIN — CIPROFLOXACIN HYDROCHLORIDE 500 MG: 500 TABLET, FILM COATED ORAL at 17:13

## 2021-06-22 RX ADMIN — SODIUM CHLORIDE, POTASSIUM CHLORIDE, SODIUM LACTATE AND CALCIUM CHLORIDE: 600; 310; 30; 20 INJECTION, SOLUTION INTRAVENOUS at 06:03

## 2021-06-22 RX ADMIN — DIPHENHYDRAMINE HYDROCHLORIDE 12.5 MG: 50 INJECTION INTRAMUSCULAR; INTRAVENOUS at 10:18

## 2021-06-22 RX ADMIN — FENTANYL CITRATE 50 MCG: 50 INJECTION, SOLUTION INTRAMUSCULAR; INTRAVENOUS at 08:31

## 2021-06-22 RX ADMIN — FENTANYL CITRATE 50 MCG: 50 INJECTION, SOLUTION INTRAMUSCULAR; INTRAVENOUS at 08:38

## 2021-06-22 RX ADMIN — FAMOTIDINE 20 MG: 20 TABLET ORAL at 17:13

## 2021-06-22 ASSESSMENT — COGNITIVE AND FUNCTIONAL STATUS - GENERAL
SUGGESTED CMS G CODE MODIFIER DAILY ACTIVITY: CJ
TOILETING: A LITTLE
DAILY ACTIVITIY SCORE: 20
MOVING TO AND FROM BED TO CHAIR: A LITTLE
HELP NEEDED FOR BATHING: A LITTLE
SUGGESTED CMS G CODE MODIFIER MOBILITY: CK
CLIMB 3 TO 5 STEPS WITH RAILING: A LITTLE
DRESSING REGULAR UPPER BODY CLOTHING: A LITTLE
MOBILITY SCORE: 18
TURNING FROM BACK TO SIDE WHILE IN FLAT BAD: A LITTLE
WALKING IN HOSPITAL ROOM: A LITTLE
DRESSING REGULAR LOWER BODY CLOTHING: A LITTLE
STANDING UP FROM CHAIR USING ARMS: A LITTLE
MOVING FROM LYING ON BACK TO SITTING ON SIDE OF FLAT BED: A LITTLE

## 2021-06-22 ASSESSMENT — PAIN DESCRIPTION - PAIN TYPE
TYPE: SURGICAL PAIN

## 2021-06-22 ASSESSMENT — LIFESTYLE VARIABLES
HAVE PEOPLE ANNOYED YOU BY CRITICIZING YOUR DRINKING: NO
DOES PATIENT WANT TO STOP DRINKING: NO
AVERAGE NUMBER OF DAYS PER WEEK YOU HAVE A DRINK CONTAINING ALCOHOL: 0
EVER HAD A DRINK FIRST THING IN THE MORNING TO STEADY YOUR NERVES TO GET RID OF A HANGOVER: NO
EVER FELT BAD OR GUILTY ABOUT YOUR DRINKING: NO
ON A TYPICAL DAY WHEN YOU DRINK ALCOHOL HOW MANY DRINKS DO YOU HAVE: 0
HOW MANY TIMES IN THE PAST YEAR HAVE YOU HAD 5 OR MORE DRINKS IN A DAY: 0
TOTAL SCORE: 0
ALCOHOL_USE: NO
TOTAL SCORE: 0
CONSUMPTION TOTAL: NEGATIVE
TOTAL SCORE: 0
HAVE YOU EVER FELT YOU SHOULD CUT DOWN ON YOUR DRINKING: NO

## 2021-06-22 ASSESSMENT — FIBROSIS 4 INDEX: FIB4 SCORE: 0.76

## 2021-06-22 ASSESSMENT — PAIN SCALES - GENERAL: PAIN_LEVEL: 4

## 2021-06-22 NOTE — OP REPORT
DATE OF SERVICE:  06/22/2021     PREOPERATIVE DIAGNOSES:  1.  Left L5 radiculopathy.  2.  L5-S1 degenerative disk disease with collapse.  3.  L5-S1 retrolisthesis.  4.  Broad-based L5-S1 disk herniation with foraminal narrowing.  5.  Failed extensive conservative care.     POSTOPERATIVE DIAGNOSES:  1.  Left L5 radiculopathy.  2.  L5-S1 degenerative disk disease with collapse.  3.  L5-S1 retrolisthesis.  4.  Broad-based L5-S1 disk herniation with foraminal narrowing.  5.  Failed extensive conservative care.     PROCEDURES:  1.  Minimally invasive L5-S1 ALIF.  2.  Partial corpectomy of L5.  3.  Partial corpectomy of S1.  4.  L5-S1 arthrodesis with 4-WEB titanium interbody cage with NanoBone   arthrodesis.  5.  Insertion of titanium cage at one level.  6.  L5-S1 anterior lumbar plating screw system.  7.  SSEPs and EMG monitoring performed by neuromonitoring associates, which   remained stable throughout.     CO-SURGEONS:  1.  Neo Gilbert MD, Neurosurgery and Spine Surgery  2.  Gigi Ernst MD, General Vascular Surgery     SECOND ASSISTANT:  Ewelina Ortiz PA-C     ANESTHESIA:  General endotracheal anesthesia.     ANESTHESIOLOGIST:  Dimitry Solano MD     COMPLICATIONS:  None.     ESTIMATED BLOOD LOSS:  Less than 30 mL.     PREOPERATIVE NOTE:  This extremely pleasant 35-year-old lady who presents with   mechanical low back pain and left lower extremity radicular leg pain,   weakness and paresthesia consistent with left L5 radiculopathy.  This was   confirmed by EMG findings as well as the clinical diagnosis.  An MRI showed   L5-S1 collapse and retrolisthesis with broad-based disk herniation and   foraminal stenosis.  The patient failed extensive conservative care including   epidural injections and physical therapy.  Given the patient's constant axial   low back pain, which is aggravated by patient with positional changes and her   persistent radiculopathy, I offered the patient indirect decompression  with   L5-S1 ALIF to restore appropriate alignment and decompress the foramen and   correct the retrolisthesis followed by posterior percutaneous pedicle screw   fixation in the hope of a posterior decompression. This was performed in a   staged procedure for this very reason.  I discussed surgical procedure,   alternatives, goals, risks, benefits and complications in detail with the   patient, used a bone model, MRI and educational handouts to assist the patient   with her decision making, answered all questions to the best of my ability.    The patient understood and consented to surgery.  Details are well contained   in the office visit notes.  We used [employer] custom 3D animations to also   assist the patient with understanding of surgical procedure.  The patient   understood and consented to surgery.     DESCRIPTION OF PROCEDURE:  The patient was brought to the operating room and   placed general endotracheal anesthesia.  She was placed supine on the   operating table with care taken to avoid all the bony prominences and   peripheral nerves.  The abdominal area was prepped and draped in the usual   sterile fashion.  Dr. Ernst performed an excellent low transverse   abdominal incision and left retroperitoneal exposure of L5-S1 was accomplished   in appropriate fashion.  Self-retaining radiolucent retractors were applied.    Excellent exposure was achieved despite the patient's body habitus with a   steep pelvis and some truncal obesity.     I incised the disk at L5-S1, removed the disk from the endplates.  The disk   space was quite arthrodesed and collapsed down, did not distract up well,   however.  Hence, in this particular instance, a partial corpectomy of L5 and a   partial corpectomy of S1 was required to adequately decompress the spinal   canal.  This required extra degree of expertise, effort and time and effort   and hence a modifier-59 is required for CPT code 89427-07502.     I curetted out the  foramen bilaterally particularly on the left side to free   up the disk.     After extensive decompression was achieved, I then templated for the   appropriate large 4WEB 16-degree lordotic implant.  The appropriate implant   was chosen for maximum height restoration.  The cage was packed with NanoBone   and delivered under distraction between the body of L5-S1 for an excellent A   plus fit.  This restored the lordosis, open up the foramen well, distracted up   the disk space well with an indirect decompression and ligamentotaxis.  The   foramina were nicely restored.  The retrolisthesis was corrected completely.    Once the cage had excellent purchase and positioning, I placed appropriate   screws through the plate mechanism of L5 and S1, securing the screws in L5 and   S1 and the locking plate mechanism was completed.  AP and lateral x-rays   confirmed excellent position of the cage and screws with excellent midline   position.  The wound was irrigated, hemostasis achieved.  Wound was closed in   multiple layers as dictated by Dr. Ernst.  Swabs, needles, and instruments   correct by two-count.  No complications were encountered.  The patient   tolerated the procedure, stable and transferred to recovery room.  The patient   will be observed at [facility] until she meets discharge   criteria.  We will obtain a CT scan tomorrow and if the patient's leg pain has   resolved as I anticipate it will be, then she will just require percutaneous   screws placed with [employer] augmented reality on Thursday.        ______________________________  CESAR AREVALO MD    JJL/PORTILLO    DD:  06/22/2021 08:21  DT:  06/22/2021 09:29    Job#:  [id number]    CC:CHERELLE Reed MD

## 2021-06-22 NOTE — ANESTHESIA TIME REPORT
Anesthesia Start and Stop Event Times     Date Time Event    6/22/2021 0651 Ready for Procedure     0702 Anesthesia Start     0821 Anesthesia Stop        Responsible Staff  06/22/21    Name Role Begin End    Dimitry Solano M.D. Anesth 0702 0821        Preop Diagnosis (Free Text):  Pre-op Diagnosis     LUMBAR STENOSIS, SPONDYLOSIS, DISC DEGENERATION        Preop Diagnosis (Codes):  Diagnosis Information     Diagnosis Code(s): Lumbar stenosis [M48.061]     Lumbar spondylosis [M47.816]     Disc degeneration, lumbar [M51.36]        Post op Diagnosis  Lumbar spinal stenosis      Premium Reason  Non-Premium    Comments:

## 2021-06-22 NOTE — OP REPORT
DATE OF SERVICE:  06/22/2021     OPERATING SURGEON:  Gigi Ernst MD     CO-SURGEON:  Neo Gilbert MD     PROCEDURE:  Anterior left iliac retroperitoneal approach for L5-S1 diskectomy   and cage fusion.     ANESTHESIA:  General.     ESTIMATED BLOOD LOSS:  Less than 10 mL.     PREOPERATIVE DIAGNOSIS:  Degenerative disk disease, L5-S1.     POSTOPERATIVE DIAGNOSIS:  Degenerative disk disease L5-S1.     SUMMARY:  This 35-year-old female who has a BMI of almost 35, chronic back   pain and radiculopathy bilaterally related to lumbosacral disk disease.  She   has evidence for left-sided disease particularly at the L5-S1 level, but also   at L4-L5.  She is scheduled to undergo as a first stage anterior lumbar   interbody fusion at the L5-S1 level.  The risks have been explained.     DESCRIPTION OF PROCEDURE:  The patient was taken to the operating room and   satisfactory general anesthesia was induced by endotracheal intubation.  The   patient was prepped and draped.     Based on the x-rays, transverse left iliac retroperitoneal approach was gone   through the thick pannus with exposure of the rectus sheath.  This was   incised.  A space behind the left rectus muscle was created.  Retroperitoneal   contents including left ureter brought to the patient's right and   self-retaining retractors were placed in the depths of the wound.     Sacral promontory was identified and skeletonized with a bipolar cautery.     The appropriate level was identified in the AP and lateral projections.     Please see the dictation of Dr. Neo Gilbert for the diskectomy and cage   fusion.     X-rays were negative for retained foreign bodies and the patient was closed   with interrupted #1 Vicryl sutures on the fascia and running 4-0 Vicryl   subcuticular skin stitches and Steri-Strips.  The wound was dressed and the   patient was sent to recovery room in good condition.  No specimens were sent   to  pathology.        ______________________________  MD FELI MOHAMUD/PORTILLO    DD:  06/22/2021 08:12  DT:  06/22/2021 08:32    Job#:  [id number]    CC:CESAR AREVALO MD

## 2021-06-22 NOTE — PROGRESS NOTES
Pt verbalized she is taking Ciprofloxacin 500 mg tabs because she has UTI that was discovered by Dr. Gilbert. Reported to Huong Wallis, about this, new orders made.

## 2021-06-22 NOTE — PROGRESS NOTES
1036 Received report from PACU BALDEV Schultz    1138 Received pt via bed, A&Ox4, on 1lpm via nasal cannula, dressing on abdomen is CD&I, pt has personal belongings at bedside with LSO brace

## 2021-06-22 NOTE — PROGRESS NOTES
2 RN Skin Check: Complete with RN Claudia    Devices in place: , SCD, nasal cannula    Skin assessed under devices: skin intact    Pink but blanching on left ear    Bony prominences intact    Dressing on abdomen is CD&I    Confirmed pressure ulcers: N/A    Wound consult: N/A    Interventions: Observed skin

## 2021-06-22 NOTE — OR NURSING
1015-pt resting comfortably, yvette checks WNL, abd dressing CDI, VSS, pt damir po fluids, c/o itching, medicated with diphenhydramine.

## 2021-06-22 NOTE — ANESTHESIA PROCEDURE NOTES
Airway    Date/Time: 6/22/2021 7:09 AM  Performed by: Dimitry Solano M.D.  Authorized by: Dimitry Solano M.D.     Location:  OR  Urgency:  Elective  Indications for Airway Management:  Anesthesia      Spontaneous Ventilation: absent    Sedation Level:  Deep  Preoxygenated: Yes    Patient Position:  Sniffing  Mask Difficulty Assessment:  1 - vent by mask  Final Airway Type:  Endotracheal airway  Final Endotracheal Airway:  ETT  Cuffed: Yes    Technique Used for Successful ETT Placement:  Direct laryngoscopy    Insertion Site:  Oral  Blade Type:  Yonny  Laryngoscope Blade/Videolaryngoscope Blade Size:  3  ETT Size (mm):  7.0  Measured from:  Teeth  ETT to Teeth (cm):  22  Placement Verified by: auscultation and capnometry    Cormack-Lehane Classification:  Grade IIa - partial view of glottis  Number of Attempts at Approach:  1

## 2021-06-22 NOTE — CARE PLAN
Problem: Pain - Standard  Goal: Alleviation of pain or a reduction in pain to the patient’s comfort goal  Outcome: Progressing  Note: Educate use of pain rating scale from 0-10. Assess oxygen, RR level and other V/S. Assess pain level per protocol.  Administer pain medications as ordered. Re-evaluate as necessary.    Demonstrate non-pharmacological ways to control pain like deep breathing, ice packs, repositioning.        Problem: Knowledge Deficit - Standard  Goal: Patient and family/care givers will demonstrate understanding of plan of care, disease process/condition, diagnostic tests and medications  Outcome: Progressing  Note: Update POC, encourage questions or to verbalize any concerns. Update about mobility plan.   The patient is Stable - Low risk of patient condition declining or worsening    Shift Goals  Clinical Goals: pain management  Patient Goals: rest    Progress made toward(s) clinical / shift goals:  progressing

## 2021-06-22 NOTE — ANESTHESIA POSTPROCEDURE EVALUATION
Patient: Kalina Collier    Procedure Summary     Date: 06/22/21 Room / Location: [place] [address] / SURGERY [facility]    Anesthesia Start: 0702 Anesthesia Stop: 0821    Procedure: FUSION, SPINE, XLIF - STAGE #1 L5-S1 ANTERIOR. (N/A Abdomen) Diagnosis:       Lumbar stenosis      Lumbar spondylosis      Disc degeneration, lumbar      (lumbar stenosis spondylosis disc degeneration )    Surgeons: Neo Gilbert M.D. Responsible Provider: Dimitry Solano M.D.    Anesthesia Type: general ASA Status: 2          Final Anesthesia Type: general  Last vitals  BP   Blood Pressure: 122/68    Temp   36.3 °C (97.4 °F)    Pulse   65   Resp   12    SpO2   99 %      Anesthesia Post Evaluation    Patient location during evaluation: PACU  Patient participation: complete - patient participated  Level of consciousness: awake and alert  Pain score: 4    Airway patency: patent  Anesthetic complications: no  Cardiovascular status: hemodynamically stable  Respiratory status: acceptable  Hydration status: euvolemic    PONV: none          No complications documented.     Nurse Pain Score: 4 (NPRS)

## 2021-06-22 NOTE — OR SURGEON
Immediate Post OP Note    PreOp Diagnosis: L5-S1 DDD, stenosis, spondylosis, listhesis, radiculopathy, low back pain      PostOp Diagnosis: Same      Procedure(s):  STAGE #1 L5-S1 ANTERIOR LUMBAR INTERBODY FUSION - Wound Class: Clean    Surgeon(s):  Neo Gilbert M.D.    Anesthesiologist/Type of Anesthesia:  Anesthesiologist: Dimitry Solano M.D./General    Surgical Staff:  Assistant: Ewelina Ortiz P.A.-C.  Cell Saver : Daniella Gutierrez  Circulator: Moon Sarkar R.N.  Scrub Person: Terese Parada  Radiology Technologist: Sandee Dye    Specimens removed if any:  * No specimens in log *    Estimated Blood Loss: 25cc    Findings: Went well. See dictation.    Complications: None.        6/22/2021 8:13 AM Ewelina Ortiz P.A.-C.

## 2021-06-22 NOTE — ANESTHESIA PREPROCEDURE EVALUATION
Relevant Problems   ANESTHESIA   (positive) PONV (postoperative nausea and vomiting)      CARDIAC   (positive) Hypertension      Other   (positive) Diabetes (HCC)       Physical Exam    Airway   Mallampati: II  TM distance: >3 FB  Neck ROM: full       Cardiovascular - normal exam  Rhythm: regular  Rate: normal  (-) murmur     Dental - normal exam           Pulmonary - normal exam  Breath sounds clear to auscultation     Abdominal    Neurological - normal exam                 Anesthesia Plan    ASA 2       Plan - general       Airway plan will be ETT          Induction: intravenous    Postoperative Plan: Postoperative administration of opioids is intended.    Pertinent diagnostic labs and testing reviewed    Informed Consent:    Anesthetic plan and risks discussed with patient.    Use of blood products discussed with: patient whom consented to blood products.

## 2021-06-23 LAB
ANION GAP SERPL CALC-SCNC: 9 MMOL/L (ref 7–16)
BUN SERPL-MCNC: 3 MG/DL (ref 8–22)
CALCIUM SERPL-MCNC: 9.2 MG/DL (ref 8.5–10.5)
CHLORIDE SERPL-SCNC: 106 MMOL/L (ref 96–112)
CO2 SERPL-SCNC: 25 MMOL/L (ref 20–33)
CREAT SERPL-MCNC: 0.49 MG/DL (ref 0.5–1.4)
ERYTHROCYTE [DISTWIDTH] IN BLOOD BY AUTOMATED COUNT: 43.1 FL (ref 35.9–50)
GLUCOSE SERPL-MCNC: 112 MG/DL (ref 65–99)
HCT VFR BLD AUTO: 36.8 % (ref 37–47)
HGB BLD-MCNC: 11.5 G/DL (ref 12–16)
MCH RBC QN AUTO: 27.2 PG (ref 27–33)
MCHC RBC AUTO-ENTMCNC: 31.3 G/DL (ref 33.6–35)
MCV RBC AUTO: 87 FL (ref 81.4–97.8)
PLATELET # BLD AUTO: 284 K/UL (ref 164–446)
PMV BLD AUTO: 10.3 FL (ref 9–12.9)
POTASSIUM SERPL-SCNC: 3.9 MMOL/L (ref 3.6–5.5)
RBC # BLD AUTO: 4.23 M/UL (ref 4.2–5.4)
SODIUM SERPL-SCNC: 140 MMOL/L (ref 135–145)
WBC # BLD AUTO: 12 K/UL (ref 4.8–10.8)

## 2021-06-23 PROCEDURE — 85027 COMPLETE CBC AUTOMATED: CPT

## 2021-06-23 PROCEDURE — A9270 NON-COVERED ITEM OR SERVICE: HCPCS | Performed by: PHYSICIAN ASSISTANT

## 2021-06-23 PROCEDURE — 700101 HCHG RX REV CODE 250: Performed by: PHYSICIAN ASSISTANT

## 2021-06-23 PROCEDURE — 700102 HCHG RX REV CODE 250 W/ 637 OVERRIDE(OP): Performed by: PHYSICIAN ASSISTANT

## 2021-06-23 PROCEDURE — 700111 HCHG RX REV CODE 636 W/ 250 OVERRIDE (IP): Performed by: PHYSICIAN ASSISTANT

## 2021-06-23 PROCEDURE — 36415 COLL VENOUS BLD VENIPUNCTURE: CPT

## 2021-06-23 PROCEDURE — 80048 BASIC METABOLIC PNL TOTAL CA: CPT

## 2021-06-23 PROCEDURE — 770006 HCHG ROOM/CARE - MED/SURG/GYN SEMI*

## 2021-06-23 RX ADMIN — HYDROMORPHONE HYDROCHLORIDE 0.5 MG: 1 INJECTION, SOLUTION INTRAMUSCULAR; INTRAVENOUS; SUBCUTANEOUS at 06:24

## 2021-06-23 RX ADMIN — FAMOTIDINE 20 MG: 20 TABLET ORAL at 16:32

## 2021-06-23 RX ADMIN — DOCUSATE SODIUM 50 MG AND SENNOSIDES 8.6 MG 1 TABLET: 8.6; 5 TABLET, FILM COATED ORAL at 21:00

## 2021-06-23 RX ADMIN — ENOXAPARIN SODIUM 40 MG: 40 INJECTION SUBCUTANEOUS at 07:42

## 2021-06-23 RX ADMIN — DOCUSATE SODIUM 100 MG: 100 CAPSULE, LIQUID FILLED ORAL at 06:24

## 2021-06-23 RX ADMIN — HYDROCODONE BITARTRATE AND ACETAMINOPHEN 1 TABLET: 5; 325 TABLET ORAL at 15:49

## 2021-06-23 RX ADMIN — FAMOTIDINE 20 MG: 20 TABLET ORAL at 06:24

## 2021-06-23 RX ADMIN — CIPROFLOXACIN HYDROCHLORIDE 500 MG: 500 TABLET, FILM COATED ORAL at 16:32

## 2021-06-23 RX ADMIN — POTASSIUM CHLORIDE, DEXTROSE MONOHYDRATE AND SODIUM CHLORIDE: 150; 5; 450 INJECTION, SOLUTION INTRAVENOUS at 00:28

## 2021-06-23 RX ADMIN — DOCUSATE SODIUM 100 MG: 100 CAPSULE, LIQUID FILLED ORAL at 16:33

## 2021-06-23 RX ADMIN — HYDROCODONE BITARTRATE AND ACETAMINOPHEN 1 TABLET: 5; 325 TABLET ORAL at 21:01

## 2021-06-23 RX ADMIN — ANTACID TABLETS 500 MG: 500 TABLET, CHEWABLE ORAL at 06:24

## 2021-06-23 RX ADMIN — CIPROFLOXACIN HYDROCHLORIDE 500 MG: 500 TABLET, FILM COATED ORAL at 06:24

## 2021-06-23 RX ADMIN — HYDROMORPHONE HYDROCHLORIDE 0.5 MG: 1 INJECTION, SOLUTION INTRAMUSCULAR; INTRAVENOUS; SUBCUTANEOUS at 03:16

## 2021-06-23 RX ADMIN — CEFAZOLIN SODIUM 2 G: 2 INJECTION, SOLUTION INTRAVENOUS at 06:24

## 2021-06-23 RX ADMIN — ANTACID TABLETS 500 MG: 500 TABLET, CHEWABLE ORAL at 16:33

## 2021-06-23 RX ADMIN — HYDROMORPHONE HYDROCHLORIDE 0.5 MG: 1 INJECTION, SOLUTION INTRAMUSCULAR; INTRAVENOUS; SUBCUTANEOUS at 13:18

## 2021-06-23 RX ADMIN — POLYETHYLENE GLYCOL 3350 1 PACKET: 17 POWDER, FOR SOLUTION ORAL at 06:24

## 2021-06-23 RX ADMIN — HYDROMORPHONE HYDROCHLORIDE 0.5 MG: 1 INJECTION, SOLUTION INTRAMUSCULAR; INTRAVENOUS; SUBCUTANEOUS at 22:42

## 2021-06-23 ASSESSMENT — PAIN DESCRIPTION - PAIN TYPE
TYPE: SURGICAL PAIN

## 2021-06-23 NOTE — THERAPY
Physical Therapy Contact Note    PT consult received and acknowledged. Chart indicates patient planned for staged surgery. Will initiate PT eval once surgical intervention complete.    Demetrice Ventura, PT, DPT  [phone]

## 2021-06-23 NOTE — CARE PLAN
The patient is Stable - Low risk of patient condition declining or worsening    Shift Goals  Clinical Goals: pain management  Patient Goals: pain control, rest.     Progress made toward(s) clinical / shift goals:  Plan of care reviewed.  Pain medication reviewed at bedside.      Patient is not progressing towards the following goals:

## 2021-06-23 NOTE — CARE PLAN
Problem: Pain - Standard  Goal: Alleviation of pain or a reduction in pain to the patient’s comfort goal  Outcome: Progressing     The patient is Stable - Low risk of patient condition declining or worsening    Shift Goals  Clinical Goals: pain management  Patient Goals: rest    Progress made toward(s) clinical / shift goals: Pt currently resting; PRN pain medications effectively controlling pain; pt encouraged to verbalize any needs or concerns; call light and belongings within reach    Patient is not progressing towards the following goals:

## 2021-06-23 NOTE — PROGRESS NOTES
Neurosurgery Progress Note    Subjective:  POD#1 L5-S1 ALIF  Doing fine, has some abdominal incisional discomfort.  Not mobilizing much.  Passing some flatus.     Exam:  Motor 5/5.  Dressing c/d/i.  Abdomen soft.     BP  Min: 107/65  Max: 122/89  Pulse  Av.9  Min: 61  Max: 71  Resp  Av.2  Min: 15  Max: 17  Temp  Av.5 °C (97.7 °F)  Min: 36.1 °C (97 °F)  Max: 37.1 °C (98.7 °F)  SpO2  Av.1 %  Min: 92 %  Max: 97 %    No data recorded    Recent Labs     21  0607   WBC 12.0*   RBC 4.23   HEMOGLOBIN 11.5*   HEMATOCRIT 36.8*   MCV 87.0   MCH 27.2   MCHC 31.3*   RDW 43.1   PLATELETCT 284   MPV 10.3     Recent Labs     21  0607   SODIUM 140   POTASSIUM 3.9   CHLORIDE 106   CO2 25   GLUCOSE 112*   BUN 3*   CREATININE 0.49*   CALCIUM 9.2               Intake/Output                       21 0700 - 21 0659 21 0700 - 21 0659     9924-6522 4698-8547 Total 5517-6827 1040-4625 Total                 Intake    P.O.  800  -- 800  360  -- 360    P.O. 800 -- 800 360 -- 360    I.V.  1581  -- 1581  --  -- --    Propofol Volume 81 -- 81 -- -- --    Volume (mL) (dextrose 5 % and 0.45 % NaCl with KCl 20 mEq) 500 -- 500 -- -- --    Volume (mL) (lactated ringers infusion) 1000 -- 1000 -- -- --    IV Piggyback  100  -- 100  --  -- --    Volume (mL) (ceFAZolin in dextrose (ANCEF) IVPB premix 2 g) 100 -- 100 -- -- --    Total Intake 2481 -- 2481 360 -- 360       Output    Urine  3000  1200 4200  --  -- --    Urine 125 -- 125 -- -- --    Output (mL) (Urethral Catheter Latex 16 Fr.) 2875 1200 4075 -- -- --    Emesis  --  -- --  0  -- 0    Emesis -- -- -- 0 -- 0    Stool  --  -- --  --  -- --    Number of Times Stooled -- -- -- 0 x -- 0 x    Blood  50  -- 50  --  -- --    Est. Blood Loss 50 -- 50 -- -- --    Total Output 3050 1200 4250 0 -- 0       Net I/O     -948 -1200 -1889 360 -- 360            Intake/Output Summary (Last 24 hours) at 2021 1106  Last data filed at 2021 0800  Gross  per 24 hour   Intake 1760 ml   Output 3300 ml   Net -1540 ml            • scopolamine  1 Patch Once   • Pharmacy Consult Request  1 Each PHARMACY TO DOSE   • MD ALERT...DO NOT ADMINISTER NSAIDS or ASPIRIN unless ORDERED By Neurosurgery  1 Each PRN   • docusate sodium  100 mg BID   • senna-docusate  1 tablet Nightly   • senna-docusate  1 tablet Q24HRS PRN   • polyethylene glycol/lytes  1 Packet DAILY   • magnesium hydroxide  30 mL QDAY PRN   • bisacodyl  10 mg Q24HRS PRN   • fleet  1 Each Once PRN   • dextrose 5 % and 0.45 % NaCl with KCl 20 mEq   Continuous   • diphenhydrAMINE  25 mg Q6HRS PRN    Or   • diphenhydrAMINE  25 mg Q6HRS PRN   • ondansetron  4 mg Q4HRS PRN   • ondansetron  4 mg Q4HRS PRN   • promethazine  12.5-25 mg Q4HRS PRN   • prochlorperazine  5-10 mg Q4HRS PRN   • promethazine  12.5-25 mg Q4HRS PRN   • methocarbamol  750 mg Q8HRS PRN   • ALPRAZolam  0.25 mg BID PRN   • labetalol  10 mg Q HOUR PRN   • benzocaine-menthol  1 Lozenge Q2HRS PRN   • calcium carbonate  500 mg BID   • HYDROcodone-acetaminophen  1 tablet Q4HRS PRN   • acetaminophen  500 mg Q6HRS PRN   • oxyCODONE immediate-release  5 mg Q4HRS PRN   • HYDROmorphone  0.5 mg Q3HRS PRN   • famotidine  20 mg BID   • ciprofloxacin  500 mg Q12HRS       Assessment and Plan:  Hospital day #2  POD #1  PT/OT/ambute as tolerated today.  NPO at midnight tonight for stage 2 tomorrow.   Prophylactic anticoagulation: yes         Start date/time: one dose today.

## 2021-06-24 ENCOUNTER — ANESTHESIA (OUTPATIENT)
Dept: SURGERY | Facility: MEDICAL CENTER | Age: 36
DRG: 455 | End: 2021-06-24
Payer: COMMERCIAL

## 2021-06-24 ENCOUNTER — ANESTHESIA EVENT (OUTPATIENT)
Dept: SURGERY | Facility: MEDICAL CENTER | Age: 36
DRG: 455 | End: 2021-06-24
Payer: COMMERCIAL

## 2021-06-24 ENCOUNTER — APPOINTMENT (OUTPATIENT)
Dept: RADIOLOGY | Facility: MEDICAL CENTER | Age: 36
DRG: 455 | End: 2021-06-24
Attending: NEUROLOGICAL SURGERY
Payer: COMMERCIAL

## 2021-06-24 LAB
ANION GAP SERPL CALC-SCNC: 10 MMOL/L (ref 7–16)
BUN SERPL-MCNC: 4 MG/DL (ref 8–22)
CALCIUM SERPL-MCNC: 8.8 MG/DL (ref 8.5–10.5)
CHLORIDE SERPL-SCNC: 105 MMOL/L (ref 96–112)
CO2 SERPL-SCNC: 26 MMOL/L (ref 20–33)
CREAT SERPL-MCNC: 0.63 MG/DL (ref 0.5–1.4)
ERYTHROCYTE [DISTWIDTH] IN BLOOD BY AUTOMATED COUNT: 44.4 FL (ref 35.9–50)
GLUCOSE SERPL-MCNC: 89 MG/DL (ref 65–99)
HCT VFR BLD AUTO: 35.6 % (ref 37–47)
HGB BLD-MCNC: 11.1 G/DL (ref 12–16)
MCH RBC QN AUTO: 27.3 PG (ref 27–33)
MCHC RBC AUTO-ENTMCNC: 31.2 G/DL (ref 33.6–35)
MCV RBC AUTO: 87.7 FL (ref 81.4–97.8)
PLATELET # BLD AUTO: 263 K/UL (ref 164–446)
PMV BLD AUTO: 10.5 FL (ref 9–12.9)
POTASSIUM SERPL-SCNC: 3.9 MMOL/L (ref 3.6–5.5)
RBC # BLD AUTO: 4.06 M/UL (ref 4.2–5.4)
SODIUM SERPL-SCNC: 141 MMOL/L (ref 135–145)
WBC # BLD AUTO: 8.3 K/UL (ref 4.8–10.8)

## 2021-06-24 PROCEDURE — 160036 HCHG PACU - EA ADDL 30 MINS PHASE I: Performed by: NEUROLOGICAL SURGERY

## 2021-06-24 PROCEDURE — 4A11X4G MONITORING OF PERIPHERAL NERVOUS ELECTRICAL ACTIVITY, INTRAOPERATIVE, EXTERNAL APPROACH: ICD-10-PCS | Performed by: NEUROLOGICAL SURGERY

## 2021-06-24 PROCEDURE — 700102 HCHG RX REV CODE 250 W/ 637 OVERRIDE(OP): Performed by: PHYSICIAN ASSISTANT

## 2021-06-24 PROCEDURE — 95940 IONM IN OPERATNG ROOM 15 MIN: CPT | Performed by: NEUROLOGICAL SURGERY

## 2021-06-24 PROCEDURE — A9270 NON-COVERED ITEM OR SERVICE: HCPCS | Performed by: PHYSICIAN ASSISTANT

## 2021-06-24 PROCEDURE — 700101 HCHG RX REV CODE 250: Performed by: NEUROLOGICAL SURGERY

## 2021-06-24 PROCEDURE — 502000 HCHG MISC OR IMPLANTS RC 0278: Performed by: NEUROLOGICAL SURGERY

## 2021-06-24 PROCEDURE — 160002 HCHG RECOVERY MINUTES (STAT): Performed by: NEUROLOGICAL SURGERY

## 2021-06-24 PROCEDURE — 700102 HCHG RX REV CODE 250 W/ 637 OVERRIDE(OP): Performed by: ANESTHESIOLOGY

## 2021-06-24 PROCEDURE — C1713 ANCHOR/SCREW BN/BN,TIS/BN: HCPCS | Performed by: NEUROLOGICAL SURGERY

## 2021-06-24 PROCEDURE — 500885 HCHG PACK, JACKSON TABLE: Performed by: NEUROLOGICAL SURGERY

## 2021-06-24 PROCEDURE — 700111 HCHG RX REV CODE 636 W/ 250 OVERRIDE (IP): Performed by: ANESTHESIOLOGY

## 2021-06-24 PROCEDURE — 85027 COMPLETE CBC AUTOMATED: CPT

## 2021-06-24 PROCEDURE — 160035 HCHG PACU - 1ST 60 MINS PHASE I: Performed by: NEUROLOGICAL SURGERY

## 2021-06-24 PROCEDURE — 700101 HCHG RX REV CODE 250: Performed by: ANESTHESIOLOGY

## 2021-06-24 PROCEDURE — 160009 HCHG ANES TIME/MIN: Performed by: NEUROLOGICAL SURGERY

## 2021-06-24 PROCEDURE — 95955 EEG DURING SURGERY: CPT | Performed by: NEUROLOGICAL SURGERY

## 2021-06-24 PROCEDURE — 80048 BASIC METABOLIC PNL TOTAL CA: CPT

## 2021-06-24 PROCEDURE — 500375 HCHG DRAIN, J-P ROUND 10FR: Performed by: NEUROLOGICAL SURGERY

## 2021-06-24 PROCEDURE — 502240 HCHG MISC OR SUPPLY RC 0272: Performed by: NEUROLOGICAL SURGERY

## 2021-06-24 PROCEDURE — 160048 HCHG OR STATISTICAL LEVEL 1-5: Performed by: NEUROLOGICAL SURGERY

## 2021-06-24 PROCEDURE — 700111 HCHG RX REV CODE 636 W/ 250 OVERRIDE (IP): Performed by: PHYSICIAN ASSISTANT

## 2021-06-24 PROCEDURE — 160042 HCHG SURGERY MINUTES - EA ADDL 1 MIN LEVEL 5: Performed by: NEUROLOGICAL SURGERY

## 2021-06-24 PROCEDURE — 95937 NEUROMUSCULAR JUNCTION TEST: CPT | Performed by: NEUROLOGICAL SURGERY

## 2021-06-24 PROCEDURE — 95864 NEEDLE EMG 4 EXTREMITIES: CPT | Performed by: NEUROLOGICAL SURGERY

## 2021-06-24 PROCEDURE — 501838 HCHG SUTURE GENERAL: Performed by: NEUROLOGICAL SURGERY

## 2021-06-24 PROCEDURE — 95925 SOMATOSENSORY TESTING: CPT | Performed by: NEUROLOGICAL SURGERY

## 2021-06-24 PROCEDURE — 0SG3071 FUSION OF LUMBOSACRAL JOINT WITH AUTOLOGOUS TISSUE SUBSTITUTE, POSTERIOR APPROACH, POSTERIOR COLUMN, OPEN APPROACH: ICD-10-PCS | Performed by: NEUROLOGICAL SURGERY

## 2021-06-24 PROCEDURE — A9270 NON-COVERED ITEM OR SERVICE: HCPCS | Performed by: ANESTHESIOLOGY

## 2021-06-24 PROCEDURE — 500367 HCHG DRAIN KIT, HEMOVAC: Performed by: NEUROLOGICAL SURGERY

## 2021-06-24 PROCEDURE — 72100 X-RAY EXAM L-S SPINE 2/3 VWS: CPT

## 2021-06-24 PROCEDURE — 700105 HCHG RX REV CODE 258: Performed by: ANESTHESIOLOGY

## 2021-06-24 PROCEDURE — 95907 NVR CNDJ TST 1-2 STUDIES: CPT | Performed by: NEUROLOGICAL SURGERY

## 2021-06-24 PROCEDURE — 700111 HCHG RX REV CODE 636 W/ 250 OVERRIDE (IP): Performed by: NEUROLOGICAL SURGERY

## 2021-06-24 PROCEDURE — 160031 HCHG SURGERY MINUTES - 1ST 30 MINS LEVEL 5: Performed by: NEUROLOGICAL SURGERY

## 2021-06-24 PROCEDURE — 0ST40ZZ RESECTION OF LUMBOSACRAL DISC, OPEN APPROACH: ICD-10-PCS | Performed by: NEUROLOGICAL SURGERY

## 2021-06-24 PROCEDURE — 770006 HCHG ROOM/CARE - MED/SURG/GYN SEMI*

## 2021-06-24 PROCEDURE — 110454 HCHG SHELL REV 250: Performed by: NEUROLOGICAL SURGERY

## 2021-06-24 RX ORDER — CEFAZOLIN SODIUM 2 G/100ML
2 INJECTION, SOLUTION INTRAVENOUS EVERY 8 HOURS
Status: COMPLETED | OUTPATIENT
Start: 2021-06-24 | End: 2021-06-25

## 2021-06-24 RX ORDER — POLYETHYLENE GLYCOL 3350 17 G/17G
1 POWDER, FOR SOLUTION ORAL 2 TIMES DAILY PRN
Status: DISCONTINUED | OUTPATIENT
Start: 2021-06-24 | End: 2021-06-25 | Stop reason: HOSPADM

## 2021-06-24 RX ORDER — DIPHENHYDRAMINE HYDROCHLORIDE 50 MG/ML
12.5 INJECTION INTRAMUSCULAR; INTRAVENOUS
Status: DISCONTINUED | OUTPATIENT
Start: 2021-06-24 | End: 2021-06-24 | Stop reason: HOSPADM

## 2021-06-24 RX ORDER — DEXAMETHASONE SODIUM PHOSPHATE 4 MG/ML
INJECTION, SOLUTION INTRA-ARTICULAR; INTRALESIONAL; INTRAMUSCULAR; INTRAVENOUS; SOFT TISSUE PRN
Status: DISCONTINUED | OUTPATIENT
Start: 2021-06-24 | End: 2021-06-24 | Stop reason: SURG

## 2021-06-24 RX ORDER — BISACODYL 10 MG
10 SUPPOSITORY, RECTAL RECTAL
Status: DISCONTINUED | OUTPATIENT
Start: 2021-06-24 | End: 2021-06-25 | Stop reason: HOSPADM

## 2021-06-24 RX ORDER — OXYCODONE HCL 5 MG/5 ML
5 SOLUTION, ORAL ORAL
Status: COMPLETED | OUTPATIENT
Start: 2021-06-24 | End: 2021-06-24

## 2021-06-24 RX ORDER — HYDROMORPHONE HYDROCHLORIDE 1 MG/ML
0.2 INJECTION, SOLUTION INTRAMUSCULAR; INTRAVENOUS; SUBCUTANEOUS
Status: DISCONTINUED | OUTPATIENT
Start: 2021-06-24 | End: 2021-06-24 | Stop reason: HOSPADM

## 2021-06-24 RX ORDER — ENEMA 19; 7 G/133ML; G/133ML
1 ENEMA RECTAL
Status: DISCONTINUED | OUTPATIENT
Start: 2021-06-24 | End: 2021-06-25 | Stop reason: HOSPADM

## 2021-06-24 RX ORDER — DOCUSATE SODIUM 100 MG/1
100 CAPSULE, LIQUID FILLED ORAL 2 TIMES DAILY
Status: DISCONTINUED | OUTPATIENT
Start: 2021-06-24 | End: 2021-06-25 | Stop reason: HOSPADM

## 2021-06-24 RX ORDER — HYDROMORPHONE HYDROCHLORIDE 1 MG/ML
0.4 INJECTION, SOLUTION INTRAMUSCULAR; INTRAVENOUS; SUBCUTANEOUS
Status: DISCONTINUED | OUTPATIENT
Start: 2021-06-24 | End: 2021-06-24 | Stop reason: HOSPADM

## 2021-06-24 RX ORDER — VANCOMYCIN HYDROCHLORIDE 1 G/20ML
INJECTION, POWDER, LYOPHILIZED, FOR SOLUTION INTRAVENOUS
Status: COMPLETED | OUTPATIENT
Start: 2021-06-24 | End: 2021-06-24

## 2021-06-24 RX ORDER — OXYCODONE HYDROCHLORIDE 5 MG/1
5-10 TABLET ORAL EVERY 4 HOURS PRN
Status: DISCONTINUED | OUTPATIENT
Start: 2021-06-24 | End: 2021-06-25 | Stop reason: HOSPADM

## 2021-06-24 RX ORDER — AMOXICILLIN 250 MG
1 CAPSULE ORAL
Status: DISCONTINUED | OUTPATIENT
Start: 2021-06-24 | End: 2021-06-25 | Stop reason: HOSPADM

## 2021-06-24 RX ORDER — AMOXICILLIN 250 MG
1 CAPSULE ORAL NIGHTLY
Status: DISCONTINUED | OUTPATIENT
Start: 2021-06-24 | End: 2021-06-25 | Stop reason: HOSPADM

## 2021-06-24 RX ORDER — OXYCODONE HCL 5 MG/5 ML
10 SOLUTION, ORAL ORAL
Status: COMPLETED | OUTPATIENT
Start: 2021-06-24 | End: 2021-06-24

## 2021-06-24 RX ORDER — CEFAZOLIN SODIUM 1 G/3ML
INJECTION, POWDER, FOR SOLUTION INTRAMUSCULAR; INTRAVENOUS PRN
Status: DISCONTINUED | OUTPATIENT
Start: 2021-06-24 | End: 2021-06-24 | Stop reason: SURG

## 2021-06-24 RX ORDER — MIDAZOLAM HYDROCHLORIDE 1 MG/ML
INJECTION INTRAMUSCULAR; INTRAVENOUS PRN
Status: DISCONTINUED | OUTPATIENT
Start: 2021-06-24 | End: 2021-06-24 | Stop reason: SURG

## 2021-06-24 RX ORDER — ONDANSETRON 2 MG/ML
4 INJECTION INTRAMUSCULAR; INTRAVENOUS
Status: DISCONTINUED | OUTPATIENT
Start: 2021-06-24 | End: 2021-06-24 | Stop reason: HOSPADM

## 2021-06-24 RX ORDER — LIDOCAINE HYDROCHLORIDE 40 MG/ML
SOLUTION TOPICAL PRN
Status: DISCONTINUED | OUTPATIENT
Start: 2021-06-24 | End: 2021-06-24 | Stop reason: SURG

## 2021-06-24 RX ORDER — DEXMEDETOMIDINE HYDROCHLORIDE 100 UG/ML
INJECTION, SOLUTION INTRAVENOUS PRN
Status: DISCONTINUED | OUTPATIENT
Start: 2021-06-24 | End: 2021-06-24 | Stop reason: SURG

## 2021-06-24 RX ORDER — HYDROMORPHONE HYDROCHLORIDE 1 MG/ML
.5-1 INJECTION, SOLUTION INTRAMUSCULAR; INTRAVENOUS; SUBCUTANEOUS
Status: DISCONTINUED | OUTPATIENT
Start: 2021-06-24 | End: 2021-06-25 | Stop reason: HOSPADM

## 2021-06-24 RX ORDER — CEFAZOLIN SODIUM 1 G/3ML
INJECTION, POWDER, FOR SOLUTION INTRAMUSCULAR; INTRAVENOUS
Status: DISCONTINUED | OUTPATIENT
Start: 2021-06-24 | End: 2021-06-24 | Stop reason: HOSPADM

## 2021-06-24 RX ORDER — SODIUM CHLORIDE, SODIUM LACTATE, POTASSIUM CHLORIDE, CALCIUM CHLORIDE 600; 310; 30; 20 MG/100ML; MG/100ML; MG/100ML; MG/100ML
INJECTION, SOLUTION INTRAVENOUS
Status: DISCONTINUED | OUTPATIENT
Start: 2021-06-24 | End: 2021-06-24 | Stop reason: SURG

## 2021-06-24 RX ORDER — HALOPERIDOL 5 MG/ML
1 INJECTION INTRAMUSCULAR
Status: DISCONTINUED | OUTPATIENT
Start: 2021-06-24 | End: 2021-06-24 | Stop reason: HOSPADM

## 2021-06-24 RX ORDER — SODIUM CHLORIDE, SODIUM LACTATE, POTASSIUM CHLORIDE, CALCIUM CHLORIDE 600; 310; 30; 20 MG/100ML; MG/100ML; MG/100ML; MG/100ML
INJECTION, SOLUTION INTRAVENOUS CONTINUOUS
Status: DISCONTINUED | OUTPATIENT
Start: 2021-06-24 | End: 2021-06-24 | Stop reason: HOSPADM

## 2021-06-24 RX ORDER — BUPIVACAINE HYDROCHLORIDE AND EPINEPHRINE 5; 5 MG/ML; UG/ML
INJECTION, SOLUTION PERINEURAL
Status: DISCONTINUED | OUTPATIENT
Start: 2021-06-24 | End: 2021-06-24 | Stop reason: HOSPADM

## 2021-06-24 RX ORDER — HYDROMORPHONE HYDROCHLORIDE 1 MG/ML
0.1 INJECTION, SOLUTION INTRAMUSCULAR; INTRAVENOUS; SUBCUTANEOUS
Status: DISCONTINUED | OUTPATIENT
Start: 2021-06-24 | End: 2021-06-24 | Stop reason: HOSPADM

## 2021-06-24 RX ADMIN — DOCUSATE SODIUM 50 MG AND SENNOSIDES 8.6 MG 1 TABLET: 8.6; 5 TABLET, FILM COATED ORAL at 21:49

## 2021-06-24 RX ADMIN — OXYCODONE 10 MG: 5 TABLET ORAL at 16:29

## 2021-06-24 RX ADMIN — HYDROMORPHONE HYDROCHLORIDE 1 MG: 1 INJECTION, SOLUTION INTRAMUSCULAR; INTRAVENOUS; SUBCUTANEOUS at 19:50

## 2021-06-24 RX ADMIN — HYDROMORPHONE HYDROCHLORIDE 0.4 MG: 1 INJECTION, SOLUTION INTRAMUSCULAR; INTRAVENOUS; SUBCUTANEOUS at 12:35

## 2021-06-24 RX ADMIN — FAMOTIDINE 20 MG: 20 TABLET ORAL at 05:23

## 2021-06-24 RX ADMIN — ROCURONIUM BROMIDE 40 MG: 10 INJECTION, SOLUTION INTRAVENOUS at 09:23

## 2021-06-24 RX ADMIN — DEXAMETHASONE SODIUM PHOSPHATE 4 MG: 4 INJECTION, SOLUTION INTRA-ARTICULAR; INTRALESIONAL; INTRAMUSCULAR; INTRAVENOUS; SOFT TISSUE at 09:28

## 2021-06-24 RX ADMIN — CIPROFLOXACIN HYDROCHLORIDE 500 MG: 500 TABLET, FILM COATED ORAL at 05:23

## 2021-06-24 RX ADMIN — FAMOTIDINE 20 MG: 20 TABLET ORAL at 16:27

## 2021-06-24 RX ADMIN — LIDOCAINE HYDROCHLORIDE 4 ML: 40 SOLUTION TOPICAL at 09:25

## 2021-06-24 RX ADMIN — HYDROMORPHONE HYDROCHLORIDE 0.2 MG: 1 INJECTION, SOLUTION INTRAMUSCULAR; INTRAVENOUS; SUBCUTANEOUS at 12:40

## 2021-06-24 RX ADMIN — FENTANYL CITRATE 100 MCG: 50 INJECTION, SOLUTION INTRAMUSCULAR; INTRAVENOUS at 09:23

## 2021-06-24 RX ADMIN — CEFAZOLIN SODIUM 2 G: 2 INJECTION, SOLUTION INTRAVENOUS at 17:01

## 2021-06-24 RX ADMIN — OXYCODONE 10 MG: 5 TABLET ORAL at 21:52

## 2021-06-24 RX ADMIN — FENTANYL CITRATE 50 MCG: 50 INJECTION, SOLUTION INTRAMUSCULAR; INTRAVENOUS at 11:42

## 2021-06-24 RX ADMIN — HYDROMORPHONE HYDROCHLORIDE 0.4 MG: 1 INJECTION, SOLUTION INTRAMUSCULAR; INTRAVENOUS; SUBCUTANEOUS at 12:25

## 2021-06-24 RX ADMIN — HYDROCODONE BITARTRATE AND ACETAMINOPHEN 1 TABLET: 5; 325 TABLET ORAL at 00:59

## 2021-06-24 RX ADMIN — DEXMEDETOMIDINE 20 MCG: 200 INJECTION, SOLUTION INTRAVENOUS at 09:23

## 2021-06-24 RX ADMIN — DOCUSATE SODIUM 100 MG: 100 CAPSULE, LIQUID FILLED ORAL at 17:02

## 2021-06-24 RX ADMIN — BUPIVACAINE 20 ML: 13.3 INJECTION, SUSPENSION, LIPOSOMAL INFILTRATION at 11:42

## 2021-06-24 RX ADMIN — ANTACID TABLETS 500 MG: 500 TABLET, CHEWABLE ORAL at 16:27

## 2021-06-24 RX ADMIN — MIDAZOLAM HYDROCHLORIDE 2 MG: 1 INJECTION, SOLUTION INTRAMUSCULAR; INTRAVENOUS at 09:23

## 2021-06-24 RX ADMIN — DEXMEDETOMIDINE 20 MCG: 200 INJECTION, SOLUTION INTRAVENOUS at 10:03

## 2021-06-24 RX ADMIN — SODIUM CHLORIDE, POTASSIUM CHLORIDE, SODIUM LACTATE AND CALCIUM CHLORIDE: 600; 310; 30; 20 INJECTION, SOLUTION INTRAVENOUS at 09:21

## 2021-06-24 RX ADMIN — OXYCODONE HYDROCHLORIDE 10 MG: 5 SOLUTION ORAL at 12:40

## 2021-06-24 RX ADMIN — CEFAZOLIN 2 G: 330 INJECTION, POWDER, FOR SOLUTION INTRAMUSCULAR; INTRAVENOUS at 09:23

## 2021-06-24 RX ADMIN — PROPOFOL 150 MG: 10 INJECTION, EMULSION INTRAVENOUS at 09:23

## 2021-06-24 ASSESSMENT — PAIN DESCRIPTION - PAIN TYPE
TYPE: SURGICAL PAIN

## 2021-06-24 ASSESSMENT — PATIENT HEALTH QUESTIONNAIRE - PHQ9
SUM OF ALL RESPONSES TO PHQ9 QUESTIONS 1 AND 2: 0
2. FEELING DOWN, DEPRESSED, IRRITABLE, OR HOPELESS: NOT AT ALL
1. LITTLE INTEREST OR PLEASURE IN DOING THINGS: NOT AT ALL

## 2021-06-24 ASSESSMENT — PAIN SCALES - GENERAL: PAIN_LEVEL: 3

## 2021-06-24 NOTE — OP REPORT
DATE OF SERVICE:  06/24/2021     PREOPERATIVE DIAGNOSES:    1.  Left L5 radiculopathy.  2.  L5-S1 degenerative disk disease with collapse and foraminal stenosis.  3.  Broad based L5-S1 disk herniation.  4.  Low back pain refractory to medical care.  5.  Status post stage I L5-S1 ALIF.     POSTOPERATIVE DIAGNOSES:    1.  Left L5 radiculopathy.  2.  L5-S1 degenerative disk disease with collapse and foraminal stenosis.  3.  Broad based L5-S1 disk herniation.  4.  Low back pain refractory to medical care.  5.  Status post stage I L5-S1 ALIF.     PROCEDURES:  Stage II of a planned 2-stage procedure for anterior, posterior,   circumferential decompression and fusion L5-S1 level.  Stage II.  1.  Minimally invasive left L5-S1 posterolateral arthrodesis with local   morselized autograft and NanoBone arthrodesis.  2.  Left L5-S1 pedicle screw fixation placed with XVision neuronavigation   assistance using [employer] screw system.  3.  Modifier-22 for extra degree of difficulty given the patient's body mass   index of 35 with comorbidities, all contributing to an extra hour for the   standard surgical procedure for increased risk, time and effort totalling over   an hour.  4.  SSEPs and EMG monitoring, intraoperative screw stimulation performed by   neuromonitoring associates, which remained stable throughout.     SURGEON:  Neo Gilbert MD, Neurosurgery and Spine Surgery.     ASSISTANT:  Marge Jha PA-C     ANESTHESIA:  General endotracheal anesthesia.     ANESTHESIOLOGIST:  Kevin Brizuela MD      COMPLICATIONS:  None.     ESTIMATED BLOOD LOSS:  Less than 20 mL     PREOPERATIVE NOTE:  This extremely pleasant 35-year-old lady presents with low   back pain, left lower extremity radicular leg pain, weakness and paresthesia   consistent with left L5 radiculopathy.  This was confirmed by EMG as well.    MRI showed L5-S1 degenerative disk disease with broad-based disk herniation   and foraminal stenosis.  The  patient underwent successful L5-S1 ALIF, which   opened up the disk space well and opened up the foramen well.  Her radicular   leg pain and weakness resolved entirely.  The patient is now planned for stage   II procedure for posterior arthrodesis and pedicle screw fixation with   minimally invasive technique using XVision neuronavigation assistance.  This   will avoid the need for a posterior decompression, which will save the patient   considerably.  I discussed surgical procedure, alternatives, goals, risks,   benefits, complications in detail with the patient.  The patient understood   and consented to surgery.  Details are well contained in the office visit   notes.  I spoke to the patient again this morning.  She was neurologically   intact without any left leg pain and hence the indirect decompression at L5-S1   worked extremely well and a posterior decompression is not indicated.  I   discussed surgical procedure, alternatives, goals, risks, benefits,   complications in detail with the patient, used a bone model, MRI and   educational handouts to assist the patient with decision making, answered all   questions to the best of my ability.  The patient understood and consented to   surgery.     DESCRIPTION OF PROCEDURE:  The patient was brought to the operating room and   placed under general endotracheal anesthesia.  She was placed prone on the   operating OSI table with care taken to avoid all the bony prominences,   peripheral nerves.  Lumbar region prepped and draped in the usual sterile   fashion.  Following localization of cross table fluoroscopy, a small incision   was made over L3 to get to the spinous process given the patient's truncal   obesity, which would have precluded clamping on the L4 spinous process given   the depth. I exposed the L3 spinous process and then placed the XVision clamp   on the spinous process for secure fit and then intraoperative CT scan was   performed with the O-arm and the  system was registered with the XVision for   neuronavigation.  I then navigated to place a small incision on the left side   at L5-S1.  I incised the skin after local anesthetic was infiltrated and   incised the fascia.  I then used the [employer] navigated tool to penetrate the   L5 and S1 pedicles and then I tapped with neuronavigation and placed the left   L5 and S1 pedicle screws in perfect position.  The patient's anatomy was quite   short and hence short screws were used overall and they were in perfect   position.  I stimulated the screws.  They stimulated over 20 millivolts each.    I placed a precontoured lesvia over the polyaxial screw heads, locking screws   applied and secured.  AP and lateral x-rays confirmed excellent position of   the 2 screws.  CT scan showed excellent position of the cage with excellent   restored lordosis and opening up the foramen.  I irrigated out the wound   copiously.  I decorticated the transverse process of L5-S1 and packed some   local morselized autograft shavings and NanoBone for posterolateral   arthrodesis at L5-S1.  Wound was irrigated, hemostasis achieved.  The fascia   was reapproximated with 0 Vicryl deep fascia.  Vancomycin powder was placed in   the wounds and both wounds were closed in multiple layers with 2-0 Vicryl   deep dermal layer and Steri-Strips to skin.  Small sterile dressing was   applied.  Swabs, needles, instruments correct by two count.  No complications   were encountered.  The patient tolerated the procedure, stable and transferred   to recovery room.  The patient will be observed at [facility] until she meets discharge criteria.  The patient will follow up at   [facility] in 2 weeks and 6 weeks' time.     INTRAOPERATIVE FINDINGS:  Stenosis at L5-S1 with collapse and broad-based   disk, which underwent L5-S1 ALIF with excellent decompression and indirect   decompression.  The patient returned today for posterior instrumented    stabilization with neuronavigation.  Given the patient's body mass index of 35   with comorbidities of hypertension, this all is consistent with morbid   obesity and adding an extra hour to the standard surgical procedure given the   two CT scan spins were required to accurately get the appropriate imaging and   the exposure and the surgery overall was an hour longer than standard   procedure and hence a modifier-22 is required to reflect the extra degree of   time, expertise and risk involved.     The patient will follow up at [facility] in 2 weeks and 6 weeks' time as   arranged.        ______________________________  CESAR AREVALO MD    JSIVANL/KRI    DD:  06/24/2021 12:19  DT:  06/24/2021 13:10    Job#:  [id number]    CC:ROBERTO BOATENG MD

## 2021-06-24 NOTE — OR NURSING
Recovery stable and satisfactory, treated for pain, had no N/E and tolerated ice chips eagerly. Father Shukri called twice with updates. I called RN Marleny and gave a detailed report with verification of floor oders. Patient currently restful and sleepy but arousable, calm and cooperative. To floor on 3 liters via NC, tank at 2.50 liters.

## 2021-06-24 NOTE — PROGRESS NOTES
Transport here to transport patient down to Pre-Op. No oxygen needs. Alert and oriented. All belongings at beside.

## 2021-06-24 NOTE — ANESTHESIA PREPROCEDURE EVALUATION
Relevant Problems   ANESTHESIA   (positive) PONV (postoperative nausea and vomiting)      CARDIAC   (positive) Carotid artery dissection (HCC)   (positive) Hypertension       Physical Exam    Airway   Mallampati: II  TM distance: >3 FB  Neck ROM: full       Cardiovascular - normal exam  Rhythm: regular  Rate: normal  (-) murmur     Dental - normal exam           Pulmonary - normal exam  Breath sounds clear to auscultation     Abdominal   (+) obese     Neurological - normal exam                 Anesthesia Plan    ASA 2       Plan - general       Airway plan will be ETT        Plan Factors:   Patient was previously instructed to abstain from smoking on day of procedure.  Patient did not smoke on day of procedure.      Induction: intravenous    Postoperative Plan: Postoperative administration of opioids is intended.    Pertinent diagnostic labs and testing reviewed    Informed Consent:    Anesthetic plan and risks discussed with patient.    Use of blood products discussed with: patient whom consented to blood products.

## 2021-06-24 NOTE — ANESTHESIA PROCEDURE NOTES
Airway    Date/Time: 6/24/2021 9:25 AM  Performed by: Kevin Brizuela M.D.  Authorized by: Kevin Brizuela M.D.     Location:  OR  Urgency:  Elective  Indications for Airway Management:  Anesthesia      Spontaneous Ventilation: absent    Sedation Level:  Deep  Preoxygenated: Yes    Patient Position:  Sniffing  Final Airway Type:  Endotracheal airway  Final Endotracheal Airway:  ETT  Cuffed: Yes    Technique Used for Successful ETT Placement:  Direct laryngoscopy    Insertion Site:  Oral  Blade Type:  Yonny  Laryngoscope Blade/Videolaryngoscope Blade Size:  3  ETT Size (mm):  7.0  Measured from:  Teeth  ETT to Teeth (cm):  23  Placement Verified by: auscultation and capnometry    Cormack-Lehane Classification:  Grade I - full view of glottis  Number of Attempts at Approach:  1

## 2021-06-24 NOTE — ANESTHESIA TIME REPORT
Anesthesia Start and Stop Event Times     Date Time Event    6/24/2021 0857 Ready for Procedure     0921 Anesthesia Start     1151 Anesthesia Stop        Responsible Staff  06/24/21    Name Role Begin End    Kevin Brizuela M.D. Anesth 0921 1151        Preop Diagnosis (Free Text):  Pre-op Diagnosis     LUMBAR STENOSIS, SPODYLOSIS, DISC DEGENERATION        Preop Diagnosis (Codes):  Diagnosis Information     Diagnosis Code(s): Lumbar stenosis [M48.061]     Lumbar spondylosis [M47.816]     Disc degeneration, lumbar [M51.36]        Post op Diagnosis  Lumbar spinal stenosis      Premium Reason  Non-Premium    Comments:

## 2021-06-24 NOTE — OR SURGEON
Immediate Post OP Note    PreOp Diagnosis: L5-S1 DDD, stenosis, lumbar radiculopathy      PostOp Diagnosis: Same.      Procedure(s):  FUSION, SPINE, LUMBAR, WITH O-ARM IMAGING GUIDANCE - STAGE #2 BILATEREAL L5-S1 PERCUTANEOUS FUSION MINIMALLY INVASIVE - Wound Class: Clean  LAMINOTOMY -  L5-S1 - Wound Class: Clean    Surgeon(s):  Neo Gilbert M.D.    Anesthesiologist/Type of Anesthesia:  Anesthesiologist: Kevin Brizuela M.D./General    Surgical Staff:  Assistant: Marge Jha P.A.-C.  Circulator: Moon Sarkar R.N.  Relief Circulator: Penny Sanches  Scrub Person: Terese Parada  Radiology Technologist: Sandee Dye    Specimens removed if any:  * No specimens in log *    Estimated Blood Loss: 50cc    Findings: See operative report.    Complications: None.        6/24/2021 11:57 AM Marge Jha P.A.-C.

## 2021-06-24 NOTE — OR NURSING
Patient received from OR following bilateral percutaneous lumber L 5 to S1 fusion by DANIELLE Poole previously done. ID, anesthesia, procedure, dressings and post op orders reviewed and verified with OR staff. PMH: DM. HTN. PONV. HTN. BMI 34.7. Allergies to Codeine, Darvocet, Morphine and PCN.

## 2021-06-24 NOTE — CARE PLAN
Problem: Pain - Standard  Goal: Alleviation of pain or a reduction in pain to the patient’s comfort goal  Outcome: Progressing   The patient is Stable - Low risk of patient condition declining or worsening    Shift Goals- Pain control  Clinical Goals: pain management  Patient Goals: rest    Progress made toward(s) clinical / shift goals:  Patient pain being controlled with PRN pain meds.     Patient is not progressing towards the following goals: Patient needs to work on mobilizing. Patient having pain and not ready to mobilize yet. Will continue to encourage.

## 2021-06-24 NOTE — PROGRESS NOTES
Patient back on floor from PACU. Sleepy but arousable. No complaints of pain at this time. New orders released.

## 2021-06-24 NOTE — ANESTHESIA POSTPROCEDURE EVALUATION
Patient: Kalina Collier    Procedure Summary     Date: 06/24/21 Room / Location: [facility] 07 / SURGERY [facility]    Anesthesia Start: 0921 Anesthesia Stop: 1151    Procedures:       FUSION, SPINE, LUMBAR, WITH O-ARM IMAGING GUIDANCE - STAGE #2 BILATEREAL L5-S1 PERCUTANEOUS FUSION MINIMALLY INVASIVE (Bilateral Back)      LAMINOTOMY -  L5-S1 (Left Back) Diagnosis:       Lumbar stenosis      Lumbar spondylosis      Disc degeneration, lumbar      (lumbar stenosis spondylosis disc degeneration)    Surgeons: Neo Gilbert M.D. Responsible Provider: Kevin Brizuela M.D.    Anesthesia Type: general ASA Status: 2          Final Anesthesia Type: general  Last vitals  BP   Blood Pressure: 113/76    Temp   36.6 °C (97.8 °F)    Pulse   91   Resp   17    SpO2   99 %      Anesthesia Post Evaluation    Patient location during evaluation: PACU  Patient participation: complete - patient participated  Level of consciousness: awake and alert  Pain score: 3    Airway patency: patent  Anesthetic complications: no  Cardiovascular status: hemodynamically stable  Respiratory status: acceptable  Hydration status: euvolemic    PONV: none          No complications documented.     Nurse Pain Score: 3 (NPRS)

## 2021-06-24 NOTE — CARE PLAN
Problem: Pain - Standard  Goal: Alleviation of pain or a reduction in pain to the patient’s comfort goal  Outcome: Progressing   Pt pain controlled with PRN Oxy and Dilaudid. Pt understands to verbalize if pain worsens.    Problem: Knowledge Deficit - Standard  Goal: Patient and family/care givers will demonstrate understanding of plan of care, disease process/condition, diagnostic tests and medications  Outcome: Progressing  Pt understands POC. No further questions at this time.    Pt NPO at [facility] for sx this morning scheduled for 0845.

## 2021-06-25 VITALS
SYSTOLIC BLOOD PRESSURE: 104 MMHG | HEART RATE: 80 BPM | RESPIRATION RATE: 16 BRPM | DIASTOLIC BLOOD PRESSURE: 66 MMHG | WEIGHT: 208.56 LBS | TEMPERATURE: 97 F | OXYGEN SATURATION: 95 % | HEIGHT: 65 IN | BODY MASS INDEX: 34.75 KG/M2

## 2021-06-25 PROCEDURE — 700102 HCHG RX REV CODE 250 W/ 637 OVERRIDE(OP): Performed by: PHYSICIAN ASSISTANT

## 2021-06-25 PROCEDURE — A9270 NON-COVERED ITEM OR SERVICE: HCPCS | Performed by: PHYSICIAN ASSISTANT

## 2021-06-25 PROCEDURE — 97165 OT EVAL LOW COMPLEX 30 MIN: CPT

## 2021-06-25 PROCEDURE — 700111 HCHG RX REV CODE 636 W/ 250 OVERRIDE (IP): Performed by: PHYSICIAN ASSISTANT

## 2021-06-25 PROCEDURE — 97162 PT EVAL MOD COMPLEX 30 MIN: CPT

## 2021-06-25 RX ORDER — OXYCODONE HYDROCHLORIDE 5 MG/1
5-10 TABLET ORAL EVERY 4 HOURS PRN
Qty: 84 TABLET | Refills: 0 | Status: SHIPPED | OUTPATIENT
Start: 2021-06-25 | End: 2021-07-09

## 2021-06-25 RX ORDER — METHOCARBAMOL 750 MG/1
750 TABLET, FILM COATED ORAL EVERY 8 HOURS PRN
Qty: 90 TABLET | Refills: 0 | Status: SHIPPED | OUTPATIENT
Start: 2021-06-25

## 2021-06-25 RX ORDER — CEPHALEXIN 500 MG/1
500 CAPSULE ORAL 4 TIMES DAILY
Qty: 20 CAPSULE | Refills: 0 | Status: SHIPPED | OUTPATIENT
Start: 2021-06-25

## 2021-06-25 RX ADMIN — DOCUSATE SODIUM 100 MG: 100 CAPSULE, LIQUID FILLED ORAL at 05:29

## 2021-06-25 RX ADMIN — POLYETHYLENE GLYCOL 3350 1 PACKET: 17 POWDER, FOR SOLUTION ORAL at 05:30

## 2021-06-25 RX ADMIN — OXYCODONE 5 MG: 5 TABLET ORAL at 08:18

## 2021-06-25 RX ADMIN — HYDROMORPHONE HYDROCHLORIDE 1 MG: 1 INJECTION, SOLUTION INTRAMUSCULAR; INTRAVENOUS; SUBCUTANEOUS at 05:29

## 2021-06-25 RX ADMIN — ANTACID TABLETS 500 MG: 500 TABLET, CHEWABLE ORAL at 05:29

## 2021-06-25 RX ADMIN — HYDROMORPHONE HYDROCHLORIDE 1 MG: 1 INJECTION, SOLUTION INTRAMUSCULAR; INTRAVENOUS; SUBCUTANEOUS at 01:31

## 2021-06-25 RX ADMIN — FAMOTIDINE 20 MG: 20 TABLET ORAL at 05:29

## 2021-06-25 RX ADMIN — CEFAZOLIN SODIUM 2 G: 2 INJECTION, SOLUTION INTRAVENOUS at 01:32

## 2021-06-25 ASSESSMENT — COGNITIVE AND FUNCTIONAL STATUS - GENERAL
SUGGESTED CMS G CODE MODIFIER DAILY ACTIVITY: CJ
DAILY ACTIVITIY SCORE: 21
HELP NEEDED FOR BATHING: A LITTLE
STANDING UP FROM CHAIR USING ARMS: A LITTLE
CLIMB 3 TO 5 STEPS WITH RAILING: A LITTLE
MOBILITY SCORE: 18
SUGGESTED CMS G CODE MODIFIER MOBILITY: CK
DRESSING REGULAR LOWER BODY CLOTHING: A LITTLE
TURNING FROM BACK TO SIDE WHILE IN FLAT BAD: A LITTLE
TOILETING: A LITTLE
WALKING IN HOSPITAL ROOM: A LITTLE
MOVING TO AND FROM BED TO CHAIR: A LITTLE
MOVING FROM LYING ON BACK TO SITTING ON SIDE OF FLAT BED: A LITTLE

## 2021-06-25 ASSESSMENT — GAIT ASSESSMENTS
DEVIATION: BRADYKINETIC
DISTANCE (FEET): 300
ASSISTIVE DEVICE: FRONT WHEEL WALKER
GAIT LEVEL OF ASSIST: SUPERVISED

## 2021-06-25 ASSESSMENT — PAIN DESCRIPTION - PAIN TYPE
TYPE: SURGICAL PAIN

## 2021-06-25 ASSESSMENT — ACTIVITIES OF DAILY LIVING (ADL): TOILETING: INDEPENDENT

## 2021-06-25 NOTE — DISCHARGE SUMMARY
DATE OF ADMISSION:  06/22/2021   DATE OF DISCHARGE:  06/25/2021     DISCHARGE DIAGNOSES:  1.  Low back pain.  2.  Bilateral lower extremity paresthesia.     SURGERY PERFORMED:  Planned 2-stage procedure performed on separate days   including an L5-S1 anterior lumbar interbody fusion followed on a separate day   by percutaneous bilateral L5-S1 fusion, both performed by Dr. Neo Gilbert.     COMPLICATIONS:  None.     REASON FOR ADMISSION:  This is a very pleasant 35-year-old female who gives   history of low back pain that has been chronic and progressive, refractory to   conservative care including physical therapy.  She has required increasing   amounts of pain medications to control her pain.  Her preoperative workup   showed L5-S1 degenerative disk disease with spondylolisthesis and bilateral   pars defects and the patient was hereby indicated for the above surgery.     HOSPITAL COURSE:  The patient was admitted on the date of surgery.  She   underwent the above surgery without complications and was moved to the   orthopedic floor.  Here in the orthopedic floor, she has made a good recovery   postoperatively.  On postoperative day #3 and 1 respectively, her pain is   controlled with oral medications.  She is ambulating.  She is voiding.  She is   tolerating oral food and medications well.  Her motor exam is 5/5.  Her   abdomen is soft and she is passing flatus and she is hereby cleared for   discharge to home under stable condition after an uneventful hospital stay.     DISCHARGE INSTRUCTIONS:  The patient is to eat a normal diet as tolerated.    She is to walk as much as tolerated.  She is to avoid repetitive bending at   the waist.  She is to avoid lifting, pushing, pulling greater than 15 pounds.    She is encouraged to ice her incision for 10-15 minutes multiple times a day.    She does have followup appointments in the office of [facility] in 2 and 6   weeks' time.  She is to keep those appointments.  She  should call our office   or go to the nearest emergency department with any emergent changes.  The   patient was given these discharge instructions verbally and she voiced   understanding of them.     DISCHARGE MEDICATIONS:  In addition to the patient's normal home medications,   she will be discharged to home on:  1.  Oxycodone 5 mg 1 tab p.o. q. 4-6 hours p.r.n. pain, dispensed #84, no   refills.  2.  Robaxin 750 mg 1 tab p.o. t.i.d. p.r.n. spasm, dispensed #90, no refills.  3.  Keflex 500 mg 1 tab p.o. q.i.d. x5 days, dispensed #20, no refills.        ______________________________  MELVIN ALVAREZ PA-C        ______________________________  MD KANDI TRIPLETT/GOROSELINE/[facility]    DD:  06/25/2021 11:18  DT:  06/25/2021 15:26    Job#:  [id number]    CC:ROBERTO BOATENG MD

## 2021-06-25 NOTE — PROGRESS NOTES
Neurosurgery Progress Note    Subjective:  POD#3 L5-S1 ALIF  POD#1 L5-S1 percutaneous fusion.  Doing fine, has some abdominal incisional discomfort.  Mobilizing some.     Exam:  Motor 5/5.  Dressing c/d/i.  Abdomen soft.     BP  Min: 102/52  Max: 116/60  Pulse  Av.2  Min: 62  Max: 90  Resp  Av.8  Min: 15  Max: 26  Temp  Av.5 °C (97.7 °F)  Min: 36.1 °C (97 °F)  Max: 36.9 °C (98.5 °F)  SpO2  Av.9 %  Min: 93 %  Max: 100 %    No data recorded    Recent Labs     21  0607 21  0415   WBC 12.0* 8.3   RBC 4.23 4.06*   HEMOGLOBIN 11.5* 11.1*   HEMATOCRIT 36.8* 35.6*   MCV 87.0 87.7   MCH 27.2 27.3   MCHC 31.3* 31.2*   RDW 43.1 44.4   PLATELETCT 284 263   MPV 10.3 10.5     Recent Labs     21  0607 21  0415   SODIUM 140 141   POTASSIUM 3.9 3.9   CHLORIDE 106 105   CO2 25 26   GLUCOSE 112* 89   BUN 3* 4*   CREATININE 0.49* 0.63   CALCIUM 9.2 8.8               Intake/Output                       21 0700 - 21 0659 21 07 - 21 0659     1900-0659 Total  Total                 Intake    P.O.  50  -- 50  240  -- 240    P.O. 50 -- 50 240 -- 240    I.V.  1550  -- 1550  --  -- --    Volume (mL) (Lactated Ringers) 750 -- 750 -- -- --    Volume (mL) (lactated ringers infusion) 800 -- 800 -- -- --    Total Intake 1600 -- 1600 240 -- 240       Output    Urine  1050  1700 2750  --  -- --    Urine 450 -- 450 -- -- --    Output (mL) ([REMOVED] Urethral Catheter Latex 16 Fr.) 600 1700 2300 -- -- --    Blood  100  -- 100  --  -- --    Est. Blood Loss 100 -- 100 -- -- --    Total Output 1150 1700 2850 -- -- --       Net I/O     450 -1700 -1250 240 -- 240            Intake/Output Summary (Last 24 hours) at 2021 1111  Last data filed at 2021 1000  Gross per 24 hour   Intake 1840 ml   Output 2850 ml   Net -1010 ml            • oxyCODONE immediate-release  5-10 mg Q4HRS PRN   • HYDROmorphone  0.5-1 mg Q3HRS PRN   • Pharmacy Consult Request  1  Each PHARMACY TO DOSE   • MD ALERT...DO NOT ADMINISTER NSAIDS or ASPIRIN unless ORDERED By Neurosurgery  1 Each PRN   • docusate sodium  100 mg BID   • senna-docusate  1 tablet Nightly   • senna-docusate  1 tablet Q24HRS PRN   • polyethylene glycol/lytes  1 Packet BID PRN   • magnesium hydroxide  30 mL QDAY PRN   • bisacodyl  10 mg Q24HRS PRN   • fleet  1 Each Once PRN   • polyethylene glycol/lytes  1 Packet DAILY   • dextrose 5 % and 0.45 % NaCl with KCl 20 mEq   Continuous   • diphenhydrAMINE  25 mg Q6HRS PRN    Or   • diphenhydrAMINE  25 mg Q6HRS PRN   • ondansetron  4 mg Q4HRS PRN   • ondansetron  4 mg Q4HRS PRN   • promethazine  12.5-25 mg Q4HRS PRN   • prochlorperazine  5-10 mg Q4HRS PRN   • promethazine  12.5-25 mg Q4HRS PRN   • methocarbamol  750 mg Q8HRS PRN   • ALPRAZolam  0.25 mg BID PRN   • labetalol  10 mg Q HOUR PRN   • benzocaine-menthol  1 Lozenge Q2HRS PRN   • calcium carbonate  500 mg BID   • HYDROcodone-acetaminophen  1 tablet Q4HRS PRN   • acetaminophen  500 mg Q6HRS PRN   • famotidine  20 mg BID       Assessment and Plan:    POD #3/1  D/c home today with FWW.

## 2021-06-25 NOTE — DISCHARGE INSTRUCTIONS
Discharge Instructions    Ok to shower.  No NSAID's for 3 months.  Avoid repetitive lifting, pushing, pulling greater than 15 pounds. Follow up with [facility] in 2/6 weeks.  Call with questions.       Discharged to home by car with relative. Discharged via wheelchair, hospital escort: Yes.  Special equipment needed: Walker and LSO brace (put on while sitting or edge of bed)    Be sure to schedule a follow-up appointment with your primary care doctor or any specialists as instructed.     Discharge Plan:        I understand that a diet low in cholesterol, fat, and sodium is recommended for good health. Unless I have been given specific instructions below for another diet, I accept this instruction as my diet prescription.   Other diet: Diabetic Diet    Special Instructions: None    · Is patient discharged on Warfarin / Coumadin?   No       Spinal Fusion, Adult, Care After  This sheet gives you information about how to care for yourself after your procedure. Your doctor may also give you more specific instructions. If you have problems or questions, contact your doctor.  Follow these instructions at home:  Medicines  · Take over-the-counter and prescription medicines only as told by your doctor. These include any medicines for pain or blood-thinning medicines (anticoagulants).  · If you were prescribed an antibiotic medicine, take it as told by your doctor. Do not stop taking the antibiotic even if you start to feel better.  · Do not drive for 24 hours if you were given a medicine to help you relax (sedative) during your procedure.  · Do not drive or use heavy machinery while taking prescription pain medicine.  If you have a brace:  · Wear the brace as told by your doctor. Take it off only as told by your doctor.  · Keep the brace clean.  Managing pain, stiffness, and swelling  · If directed, put ice on the surgery area:  ? If you have a removable brace, take it off as told by your doctor.  ? Put ice in a plastic  bag.  ? Place a towel between your skin and the bag.  ? Leave the ice on for 20 minutes, 2-3 times a day.  Surgery cut care    · Follow instructions from your doctor about how to take care of your cut from surgery (incision). Make sure you:  ? Wash your hands with soap and water before you change your bandage (dressing). If you cannot use soap and water, use hand .  ? Change your bandage as told by your doctor.  ? Leave stitches (sutures), skin glue, or skin tape (adhesive) strips in place. They may need to stay in place for 2 weeks or longer. If tape strips get loose and curl up, you may trim the loose edges. Do not remove tape strips completely unless your doctor says it is okay.  · Keep your cut from surgery clean and dry.  ? Do not take baths, swim, or use a hot tub until your doctor says it is okay.  ? Ask your doctor if you can take showers. You may only be allowed to take sponge baths.  · Every day, check your cut from surgery and the area around it for:  ? More redness, swelling, or pain.  ? Fluid or blood.  ? Warmth.  ? Pus or a bad smell.  · If you have a drain tube, follow instructions from your doctor about caring for it. Do not take out the drain tube or any bandages unless your doctor says it is okay.  Physical activity  · Rest and protect your back as much as possible.  · Follow instructions from your doctor about how to move. Use good posture to help your spine heal.  · Do not lift anything that is heavier than 8 lb (3.6 kg), or the limit that you are told, until your doctor says that it is safe.  · Do not twist or bend at the waist until your doctor says it is okay.  · It is best if you:  ? Do not make pushing and pulling motions.  ? Do not sit or lie down in the same position for a long time.  ? Do not raise your hands or arms above your head.  · Return to your normal activities as told by your doctor. Ask your doctor what activities are safe for you. Rest and protect your back as much as  you can.  · Do not start to exercise until your doctor says it is okay. Ask your doctor what kinds of exercise you can do to make your back stronger.  General instructions  · To prevent blood clots and lessen swelling in your legs:  ? Wear compression stockings as told.  ? Walk one or more times every few hours as told by your doctor.  · Do not use any products that contain nicotine or tobacco, such as cigarettes and e-cigarettes. These can delay bone healing. If you need help quitting, ask your doctor.  · To prevent or treat constipation while you are taking prescription pain medicine, your doctor may suggest that you:  ? Drink enough fluid to keep your pee (urine) pale yellow.  ? Take over-the-counter or prescription medicines.  ? Eat foods that are high in fiber. These include fresh fruits and vegetables, whole grains, and beans.  ? Limit foods that are high in fat and processed sugars, such as fried and sweet foods.  · Keep all follow-up visits as told by your doctor. This is important.  Contact a doctor if:  · Your pain gets worse.  · Your medicine does not help your pain.  · Your legs or feet get painful or swollen.  · Your cut from surgery is more red, swollen, or painful.  · Your cut from surgery feels warm to the touch.  · You have:  ? Fluid or blood coming from your cut from surgery.  ? Pus or a bad smell coming from your cut from surgery.  ? A fever.  ? Weakness or loss of feeling (numbness) in your legs that is new or getting worse.  ? Trouble controlling when you pee (urinate) or poop (have a bowel movement).  · You feel sick to your stomach (nauseous).  · You throw up (vomit).  Get help right away if:  · Your pain is very bad.  · You have chest pain.  · You have trouble breathing.  · You start to have a cough.  These symptoms may be an emergency. Do not wait to see if the symptoms will go away. Get medical help right away. Call your local emergency services (911 in the [place] Do not drive yourself to  the hospital.  Summary  · After the procedure, it is common to have pain in your back and pain by your surgery cut(s).  · Icing and pain medicines may help to control the pain. Follow directions from your doctor.  · Rest and protect your back as much as possible. Do not twist or bend at the waist.  · Get up and walk one or more times every few hours as told by your doctor.  This information is not intended to replace advice given to you by your health care provider. Make sure you discuss any questions you have with your health care provider.  Document Released: 04/12/2012 Document Revised: 04/09/2020 Document Reviewed: 04/03/2018  [employer] Patient Education © 2020 [employer]      Depression / Suicide Risk    As you are discharged from this [facility] facility, it is important to learn how to keep safe from harming yourself.    Recognize the warning signs:  · Abrupt changes in personality, positive or negative- including increase in energy   · Giving away possessions  · Change in eating patterns- significant weight changes-  positive or negative  · Change in sleeping patterns- unable to sleep or sleeping all the time   · Unwillingness or inability to communicate  · Depression  · Unusual sadness, discouragement and loneliness  · Talk of wanting to die  · Neglect of personal appearance   · Rebelliousness- reckless behavior  · Withdrawal from people/activities they love  · Confusion- inability to concentrate     If you or a loved one observes any of these behaviors or has concerns about self-harm, here's what you can do:  · Talk about it- your feelings and reasons for harming yourself  · Remove any means that you might use to hurt yourself (examples: pills, rope, extension cords, firearm)  · Get professional help from the community (Mental Health, Substance Abuse, psychological counseling)  · Do not be alone:Call your Safe Contact- someone whom you trust who will be there for you.  · Call your local CRISIS HOTLINE  967-1682 or 451-044-9399  · Call your local Children's Mobile Crisis Response Team [place] (639) 216-5077 or www.[employer]  · Call the toll free National Suicide Prevention Hotlines   · National Suicide Prevention Lifeline 982-266-EMWB (4397)  · National [place] Line Network 800-SUICIDE (732-4543)

## 2021-06-25 NOTE — THERAPY
Physical Therapy   Initial Evaluation     Patient Name: Kalina Collier  Age:  35 y.o., Sex:  female  Medical Record #: [id number]  Today's Date: 6/25/2021     Precautions:Lumbosacral Orthosis, Spinal / Back Precautions     Assessment  Patient is 35 y.o. female POD #3 L5-S1 ALIF, POD #1 L5-S1 percutaneous fusion.  Today patient with B LE strength grossly 5/5, no report of altered LE sensation.  She was able to ambulate approx. 500 ft with FWW and SPV, bradykinetic gait however no LOB.  Patient was able to ascend/descend 1 step with B handrail support.  Instructed patient on spinal precautions and log roll, patient with good understanding & demonstration.  Patient has no further acute mobility needs.    Plan    Recommend Physical Therapy for Evaluation only.    DC Equipment Recommendations: Front-Wheel Walker  Discharge Recommendations: Anticipate that the patient will have no further physical therapy needs after discharge from the hospital       Objective     06/25/21 0957   Precautions   Precautions Lumbosacral Orthosis;Spinal / Back Precautions    Pain   Pain Scales 0 to 10 Scale    Intervention Ambulation / Increased Activity   Pain 0 - 10 Group   Location Back   Pain Rating Scale (NPRS) 5   Comfort Goal Comfort with Movement;Perform Activity   Therapist Pain Assessment Post Activity Pain Same as Prior to Activity   Prior Living Situation   Prior Services Home-Independent   Housing / Facility 1 Story House   Steps Into Home 1   Equipment Owned None   Lives with - Patient's Self Care Capacity Child Less than 18 Years of Age   Comments Reports her 18, 14, & 13 year old kids are able to help.  Patient's mom will also be available to help   Prior Level of Functional Mobility   Bed Mobility Independent   Transfer Status Independent   Ambulation Independent   Distance Ambulation (Feet)   (community)   Assistive Devices Used None   Stairs Independent   Cognition    Cognition / Consciousness WDL   Level of  Consciousness Alert   Comments Pleasant & cooperative   Active ROM Lower Body    Active ROM Lower Body  WDL   Strength Lower Body   Lower Body Strength  WDL   Comments B LE grossly 5/5   Sensation Lower Body   Lower Extremity Sensation   WDL   Comments no report of altered sensation   Balance Assessment   Sitting Balance (Static) Fair +   Sitting Balance (Dynamic) Fair   Standing Balance (Static) Fair   Standing Balance (Dynamic) Fair   Weight Shift Sitting Fair   Weight Shift Standing Fair   Comments w/ FWW   Gait Analysis   Gait Level Of Assist Supervised   Assistive Device Front Wheel Walker   Distance (Feet) 300   # of Times Distance was Traveled 1   Deviation Bradykinetic   # of Stairs Climbed 1   Level of Assist with Stairs Supervised   Weight Bearing Status No restrictions   Bed Mobility    Supine to Sit Supervised   Sit to Supine (Up in chair post session)   Scooting Supervised   Rolling Supervised   Comments Instructed on log roll   Functional Mobility   Sit to Stand Supervised   Bed, Chair, Wheelchair Transfer Supervised   Transfer Method Stand Step   Mobility Ambulation, stairs   Activity Tolerance   Sitting in Chair Post session   Sitting Edge of Bed 5 min   Standing 7 min   Session Information   Date / Session Number  6/25 - 1x only

## 2021-06-25 NOTE — PROGRESS NOTES
D/C'd with FWW and LSO brace.  Home medication given back to pt. Discharge instructions provided to pt.  Pt states understanding.  Pt states all questions have been answered.  Copy of discharge provided to pt.  Signed copy in chart.   Pt states that all personal belongings are in possession.   Pt escorted off unit with assistance from this RN and CNA without incident.

## 2021-06-25 NOTE — CARE PLAN
Problem: Pain - Standard  Goal: Alleviation of pain or a reduction in pain to the patient’s comfort goal  Outcome: Progressing     Problem: Knowledge Deficit - Standard  Goal: Patient and family/care givers will demonstrate understanding of plan of care, disease process/condition, diagnostic tests and medications  Outcome: Progressing     The patient is Stable - Low risk of patient condition declining or worsening    Shift Goals  Clinical Goals: pain management  Patient Goals: rest    Progress made toward(s) clinical / shift goals:  VSS on room air. POC discussed with patient. All questions and concerns during this time. Patient managed with PRN meds, see MAR. Gabriel cath in place. Patient ambulated around [place]. SCDs on. Patient resting comfortably in bed. Hourly rounding in place.

## 2021-06-25 NOTE — THERAPY
"Occupational Therapy   Initial Evaluation     Patient Name: Kalina Collier  Age:  35 y.o., Sex:  female  Medical Record #: [id number]  Today's Date: 6/25/2021     Precautions  Precautions: Lumbosacral Orthosis, Spinal / Back Precautions   Comments: LSO at EOB    Assessment  Patient is 35 y.o. female admitted for multi stage lumbar fusion (ALIF and percutaneous fusion) pt to wear LSO don at EOB. Pt normally independent with all ADLs and functional mobility without an AD at baseline working full time and in school. Pt lives in a [facility] with 3 teenage children and has mother coming to assist as well. Pt able to complete all mobility and ADLs with supervision and use of adaptive equipment. Pt educated on spinal precautions, brace management, and adaptive equipment for lower body dressing. Patient will not be actively followed for occupational therapy services at this time, however may be seen if requested by physician for 1 more visit within 30 days to address any discharge or equipment needs.     Plan    Recommend Occupational Therapy for Evaluation only.    DC Equipment Recommendations: Other (Comments), Tub Transfer Bench (hip kit)  Discharge Recommendations: Anticipate that the patient will have no further occupational therapy needs after discharge from the hospital     Subjective    \"My kids can help with that\"     Objective       06/25/21 0952   Prior Living Situation   Prior Services Home-Independent   Housing / Facility 1 Story House   Steps Into Home 1   Steps In Home 0   Bathroom Set up Bathtub / Shower Combination   Equipment Owned None   Lives with - Patient's Self Care Capacity Child Less than 18 Years of Age   Comments Reports her teenage kids can assist and mother coming to assist as well   Prior Level of ADL Function   Self Feeding Independent   Grooming / Hygiene Independent   Bathing Independent   Dressing Independent   Toileting Independent   Prior Level of IADL Function   Medication Management " Independent   Laundry Independent   Kitchen Mobility Independent   Finances Independent   Home Management Independent   Shopping Independent   Prior Level Of Mobility Independent Without Device in Community   Driving / Transportation Driving Independent   Occupation (Pre-Hospital Vocational) Employed Full Time   History of Falls   History of Falls No   Precautions   Precautions Lumbosacral Orthosis;Spinal / Back Precautions    Comments LSO at EOB   Pain 0 - 10 Group   Therapist Pain Assessment Post Activity Pain Same as Prior to Activity;Nurse Notified   Cognition    Cognition / Consciousness WDL   Level of Consciousness Alert   Comments Pleasant, cooperative, receptive to all education   Active ROM Upper Body   Active ROM Upper Body  WDL   Dominant Hand Right   Strength Upper Body   Upper Body Strength  WDL   Sensation Upper Body   Upper Extremity Sensation  WDL   Upper Body Muscle Tone   Upper Body Muscle Tone  WDL   Neurological Concerns   Neurological Concerns No   Coordination Upper Body   Coordination WDL   Balance Assessment   Sitting Balance (Static) Good   Sitting Balance (Dynamic) Fair   Standing Balance (Static) Fair   Standing Balance (Dynamic) Fair   Weight Shift Sitting Fair   Weight Shift Standing Fair   Comments w/ FWW   Bed Mobility    Supine to Sit Supervised   Scooting Supervised   Rolling Supervised   ADL Assessment   Grooming Supervision   Upper Body Dressing Supervision   Lower Body Dressing Supervision  (w/ adaptive equipment)   Toileting   (NT-refused need)   How much help from another person does the patient currently need...   Putting on and taking off regular lower body clothing? 3   Bathing (including washing, rinsing, and drying)? 3   Toileting, which includes using a toilet, bedpan, or urinal? 3   Putting on and taking off regular upper body clothing? 4   Taking care of personal grooming such as brushing teeth? 4   Eating meals? 4   6 Clicks Daily Activity Score 21   Functional Mobility    Sit to Stand Supervised   Bed, Chair, Wheelchair Transfer Supervised   Toilet Transfers Refused   Transfer Method Stand Step   Mobility bed mobility, hallway mobility, up to chair   Comments w/ FWW   Visual Perception   Visual Perception  Not Applicable   Activity Tolerance   Sitting in Chair left seated in chair   Sitting Edge of Bed 10   Standing 10   Education Group   Education Provided Role of Occupational Therapist;Back Safety;Adaptive Equipment   Role of Occupational Therapist Patient Response Patient;Acceptance;Explanation   Back Safety Patient Response Patient;Acceptance;Explanation;Handout;Action Demonstration   Adaptive Equipment Patient Response Patient;Acceptance;Explanation;Handout;Action Demonstration   Problem List   Problem List None   Interdisciplinary Plan of Care Collaboration   IDT Collaboration with  Nursing;Physical Therapist   Patient Position at End of Therapy Seated;Call Light within Reach;Tray Table within Reach;Phone within Reach   Collaboration Comments RN updated

## 2021-06-25 NOTE — CARE PLAN
The patient is Stable - Low risk of patient condition declining or worsening    Shift Goals  Clinical Goals: pain management  Patient Goals: rest    Progress made toward(s) clinical / shift goals:     Problem: Pain - Standard  Goal: Alleviation of pain or a reduction in pain to the patient’s comfort goal  Outcome: Met     Problem: Knowledge Deficit - Standard  Goal: Patient and family/care givers will demonstrate understanding of plan of care, disease process/condition, diagnostic tests and medications  Outcome: Met

## 2022-09-28 NOTE — PROGRESS NOTES
"   Orthopaedic Progress Note    Interval changes:  Patient doing well  To OR tomorrow with Dr Neville for right foot ORIF  NPO after midnight     ROS - Patient denies any new issues.  Pain well controlled.    Blood pressure 114/73, pulse 86, temperature 36.9 °C (98.4 °F), temperature source Temporal, resp. rate 17, height 1.651 m (5' 5\"), weight 90.6 kg (199 lb 11.8 oz), last menstrual period 12/02/2018, SpO2 94 %, not currently breastfeeding.      Patient seen and examined  No acute distress  Breathing non labored  RRR  Right foot in cam walking boot, min/mod edema, DNVI, cap refill < 2 sec.     Recent Labs      12/28/18   0517  12/29/18   0258  12/30/18   0323   WBC  7.0  8.1  6.7   RBC  3.70*  3.58*  3.59*   HEMOGLOBIN  9.9*  9.7*  9.7*   HEMATOCRIT  30.8*  30.4*  30.7*   MCV  83.2  84.9  85.5   MCH  26.8*  27.1  27.0   MCHC  32.1*  31.9*  31.6*   RDW  46.3  46.9  47.1   PLATELETCT  266  298  289   MPV  10.0  10.3  10.4       Active Hospital Problems    Diagnosis   • Carotid artery dissection (HCC) [I77.71]     Priority: High   • Lisfranc dislocation, right, initial encounter [S93.324A]     Priority: High   • Lumbar transverse process fracture (HCC) [S32.009A]     Priority: High   • No contraindication to deep vein thrombosis (DVT) prophylaxis [Z78.9]     Priority: Low   • Trauma [T14.90XA]     Priority: Low   • Phalanx, distal fracture of finger [S62.349A]     Priority: Low       Assessment/Plan:  To OR tomorrow foot ORIF  NPO midnight   " operating room

## (undated) DEVICE — KIT SURGIFLO W/OUT THROMBIN - (6EA/CA)

## (undated) DEVICE — SET EXTENSION WITH 2 PORTS (48EA/CA) ***PART #2C8610 IS A SUBSTITUTE*****

## (undated) DEVICE — LACTATED RINGERS INJ 1000 ML - (14EA/CA 60CA/PF)

## (undated) DEVICE — GLOVE BIOGEL INDICATOR SZ 7.5 SURGICAL PF LTX - (50PR/BX 4BX/CA)

## (undated) DEVICE — MASK ANESTHESIA ADULT  - (100/CA)

## (undated) DEVICE — SET EPIDURAL BURRON NON-SAFETY (12EA/CA)

## (undated) DEVICE — SUTURE 0 VICRYL PLUS CT-1 - 36 INCH (36/BX)

## (undated) DEVICE — CLOSURE SKIN STRIP 1/2 X 4 IN - (STERI STRIP) (50/BX 4BX/CA)

## (undated) DEVICE — TOWEL STOP TIMEOUT SAFETY FLAG (40EA/CA)

## (undated) DEVICE — PACK NEURO - (2EA/CA)

## (undated) DEVICE — CHLORAPREP 26 ML APPLICATOR - ORANGE TINT(25/CA)

## (undated) DEVICE — KIT ANESTHESIA W/CIRCUIT & 3/LT BAG W/FILTER (20EA/CA)

## (undated) DEVICE — SUCTION INSTRUMENT YANKAUER BULBOUS TIP W/O VENT (50EA/CA)

## (undated) DEVICE — TRAY CATHETER FOLEY URINE METER W/STATLOCK 350ML (10EA/CA)

## (undated) DEVICE — PACK JACKSON TABLE KIT W/OUT - HR (6EA/CA)

## (undated) DEVICE — TUBING CLEARLINK DUO-VENT - C-FLO (48EA/CA)

## (undated) DEVICE — SLEEVE, VASO, THIGH, MED

## (undated) DEVICE — INTRAOP NEURO IN OR 1:1 PER 15 MIN

## (undated) DEVICE — BOVIE BLADE COATED - (50/PK)

## (undated) DEVICE — FIBRILLAR SURGICEL 4X4 - 10/CA

## (undated) DEVICE — WIRE K

## (undated) DEVICE — SODIUM CHL IRRIGATION 0.9% 1000ML (12EA/CA)

## (undated) DEVICE — GLOVE BIOGEL PI INDICATOR SZ 8.0 SURGICAL PF LF -(50/BX 4BX/CA)

## (undated) DEVICE — KIT EVACUATER 3 SPRING PVC LF 1/8 DRAIN SIZE (10EA/CA)"

## (undated) DEVICE — PIN INTRODUCER DIAMOND TIP 8GA X 15CM

## (undated) DEVICE — DRAPE C-ARM LARGE 41IN X 74 IN - (10/BX 2BX/CA)

## (undated) DEVICE — DRAPE LAPAROTOMY T SHEET - (12EA/CA)

## (undated) DEVICE — HEAD HOLDER JUNIOR/ADULT

## (undated) DEVICE — ELECTRODE DUAL RETURN W/ CORD - (50/PK)

## (undated) DEVICE — SET LEADWIRE 5 LEAD BEDSIDE DISPOSABLE ECG (1SET OF 5/EA)

## (undated) DEVICE — NEPTUNE 4 PORT MANIFOLD - (20/PK)

## (undated) DEVICE — GLOVE BIOGEL PI INDICATOR SZ 6.5 SURGICAL PF LF - (50/BX 4BX/CA)

## (undated) DEVICE — BANDAGE ELASTIC 4 HONEYCOMB - 4"X5YD LF (20/CA)"

## (undated) DEVICE — COVER MAYO STAND X-LG - (22EA/CA)

## (undated) DEVICE — MIDAS LUBRICATOR DIFFUSER PACK (4EA/CA)

## (undated) DEVICE — DRAPE SRG LG BCK TBL DISP - 10/CA

## (undated) DEVICE — Device

## (undated) DEVICE — SYRINGE 30 ML LL (56/BX)

## (undated) DEVICE — MARKER PATIENT STERILE XVISION SYSTEM 6 PACK

## (undated) DEVICE — SYRINGE NON SAFETY 10 CC 20 GA X 1-1/2 IN (100/BX 4BX/CA)

## (undated) DEVICE — CANISTER SUCTION 3000ML MECHANICAL FILTER AUTO SHUTOFF MEDI-VAC NONSTERILE LF DISP  (40EA/CA)

## (undated) DEVICE — PACK LOWER EXTREMITY - (2/CA)

## (undated) DEVICE — GLOVE BIOGEL SZ 6 PF LATEX - (50EA/BX 4BX/CA)

## (undated) DEVICE — GLOVE BIOGEL SZ 8 SURGICAL PF LTX - (50PR/BX 4BX/CA)

## (undated) DEVICE — BOVIE  BLADE 6 EXTENDED - (50/PK)

## (undated) DEVICE — PROTECTOR ULNA NERVE - (36PR/CA)

## (undated) DEVICE — ELECTRODE 850 FOAM ADHESIVE - HYDROGEL RADIOTRNSPRNT (50/PK)

## (undated) DEVICE — SYRINGE DISP. 60 CC LL - (30/BX, 12BX/CA)**WHEN THESE ARE GONE ORDER #500206**

## (undated) DEVICE — SUTURE 1 VICRYL PLUS CT-1 - 18 INCH (12/BX)

## (undated) DEVICE — DRAIN JACKSON PRATT 10FR - (10/CS)

## (undated) DEVICE — DRAPE C ARMOR (12EA/CA)

## (undated) DEVICE — GOWN WARMING STANDARD FLEX - (30/CA)

## (undated) DEVICE — PADDING CAST 4 IN STERILE - 4 X 4 YDS (24/CA)

## (undated) DEVICE — TOURNIQUET CUFF 34 X 4 ONE PORT DISP - STERILE (10/BX)

## (undated) DEVICE — PADDING CAST 6 IN STERILE - 6 X 4 YDS (24/CA)

## (undated) DEVICE — GLOVE BIOGEL SZ 7.5 SURGICAL PF LTX - (50PR/BX 4BX/CA)

## (undated) DEVICE — GLOVE SZ 8 BIOGEL PI MICRO - PF LF (50PR/BX)

## (undated) DEVICE — DRAPE SURG STERI-DRAPE 7X11OD - (40EA/CA)

## (undated) DEVICE — DRAPE LARGE 3 QUARTER - (20/CA)

## (undated) DEVICE — RESERVOIR SUCTION 100 CC - SILICONE (20EA/CA)

## (undated) DEVICE — GLOVE BIOGEL SZ 6.5 SURGICAL PF LTX (50PR/BX 4BX/CA)

## (undated) DEVICE — SPONGE GAUZESTER 4 X 4 4PLY - (128PK/CA)

## (undated) DEVICE — DRAPE 36X28IN RAD CARM BND BG - (25/CA) O

## (undated) DEVICE — SUTURE 3-0 ETHILON FS-1 - (36/BX) 30 INCH

## (undated) DEVICE — DRILL BIT 2.7 TWIST 310.26 - (6TX2=12)

## (undated) DEVICE — TUBING C&T SET FLYING LEADS DRAIN TUBING (10EA/BX)

## (undated) DEVICE — SOD. CHL. INJ. 0.9% 1000 ML - (14EA/CA 60CA/PF)

## (undated) DEVICE — NEEDLE SAFETY 18 GA X 1 1/2 IN (100EA/BX)

## (undated) DEVICE — DRAPE PATIENT STERILE FOR USE WITH O OR C ARMS (10EA/BX)

## (undated) DEVICE — LIGHTSOURCE LONG ROUND PHOTONSABER 12FR

## (undated) DEVICE — SUTURE 2-0 VICRYL PLUS CT-1 - 8 X 18 INCH(12/BX)

## (undated) DEVICE — LIGHT SOURCE MIS 12FT

## (undated) DEVICE — DRAPE MAYO STAND - (30/CA)

## (undated) DEVICE — HEADREST PRONEVIEW LARGE - (10/CA)

## (undated) DEVICE — SUTURE 2-0 VICRYL PLUS CT-1 36 (36PK/BX)"

## (undated) DEVICE — DRAPE U ORTHOPEDIC - (10/BX)

## (undated) DEVICE — GLOVE BIOGEL INDICATOR SZ 6.5 SURGICAL PF LTX - (50PR/BX 4BX/CA)

## (undated) DEVICE — DETERGENT RENUZYME PLUS 10 OZ PACKET (50/BX)

## (undated) DEVICE — TOOL DISSECT MATCH HEAD

## (undated) DEVICE — CELLSAVER STAT

## (undated) DEVICE — KIT ROOM DECONTAMINATION

## (undated) DEVICE — SUTURE GENERAL

## (undated) DEVICE — SENSOR SPO2 NEO LNCS ADHESIVE (20/BX) SEE USER NOTES

## (undated) DEVICE — ARMREST CRADLE FOAM - (2PR/PK 12PR/CA)

## (undated) DEVICE — TUBE CONNECT SUCTION CLEAR 120 X 1/4" (50EA/CA)"

## (undated) DEVICE — GLOVE BIOGEL SZ 7 SURGICAL PF LTX - (50PR/BX 4BX/CA)

## (undated) DEVICE — GLOVE BIOGEL PI ORTHO SZ 7 PF LF (40PR/BX)

## (undated) DEVICE — DRILL BIT QC 2.0 140MM DEPTH MRK - (6TX2+1TX3=15)

## (undated) DEVICE — LACTATED RINGERS INJ. 500 ML - (24EA/CA)

## (undated) DEVICE — GLOVE SZ 6.5 BIOGEL PI MICRO - PF LF (50PR/BX)

## (undated) DEVICE — CELLSAVER PACK

## (undated) DEVICE — TOWELS CLOTH SURGICAL - (4/PK 20PK/CA)

## (undated) DEVICE — DRAPE STRLE REG TOWEL 18X24 - (10/BX 4BX/CA)"

## (undated) DEVICE — NEEDLE 20 GA X 1 INCH - NON SAFTEY (100/BX)